# Patient Record
Sex: MALE | Employment: OTHER | URBAN - METROPOLITAN AREA
[De-identification: names, ages, dates, MRNs, and addresses within clinical notes are randomized per-mention and may not be internally consistent; named-entity substitution may affect disease eponyms.]

---

## 2018-05-30 ENCOUNTER — RECORDS - HEALTHEAST (OUTPATIENT)
Dept: LAB | Facility: CLINIC | Age: 73
End: 2018-05-30

## 2018-05-31 LAB
ANION GAP SERPL CALCULATED.3IONS-SCNC: 9 MMOL/L (ref 5–18)
BUN SERPL-MCNC: 31 MG/DL (ref 8–28)
CALCIUM SERPL-MCNC: 8.7 MG/DL (ref 8.5–10.5)
CHLORIDE BLD-SCNC: 106 MMOL/L (ref 98–107)
CO2 SERPL-SCNC: 30 MMOL/L (ref 22–31)
CREAT SERPL-MCNC: 2.15 MG/DL (ref 0.7–1.3)
GFR SERPL CREATININE-BSD FRML MDRD: 30 ML/MIN/1.73M2
GLUCOSE BLD-MCNC: 90 MG/DL (ref 70–125)
POTASSIUM BLD-SCNC: 3.8 MMOL/L (ref 3.5–5)
SODIUM SERPL-SCNC: 145 MMOL/L (ref 136–145)

## 2020-10-29 ENCOUNTER — NURSING HOME VISIT (OUTPATIENT)
Dept: GERIATRICS | Facility: CLINIC | Age: 75
End: 2020-10-29
Payer: COMMERCIAL

## 2020-10-29 VITALS
SYSTOLIC BLOOD PRESSURE: 77 MMHG | RESPIRATION RATE: 18 BRPM | OXYGEN SATURATION: 95 % | WEIGHT: 192 LBS | DIASTOLIC BLOOD PRESSURE: 51 MMHG | HEART RATE: 80 BPM | BODY MASS INDEX: 26.88 KG/M2 | TEMPERATURE: 96.9 F | HEIGHT: 71 IN

## 2020-10-29 DIAGNOSIS — D62 ANEMIA DUE TO BLOOD LOSS, ACUTE: ICD-10-CM

## 2020-10-29 DIAGNOSIS — G47.00 INSOMNIA, UNSPECIFIED TYPE: Primary | ICD-10-CM

## 2020-10-29 DIAGNOSIS — I48.0 PAF (PAROXYSMAL ATRIAL FIBRILLATION) (H): ICD-10-CM

## 2020-10-29 DIAGNOSIS — R53.81 PHYSICAL DECONDITIONING: ICD-10-CM

## 2020-10-29 DIAGNOSIS — I50.22 CHRONIC SYSTOLIC CONGESTIVE HEART FAILURE (H): ICD-10-CM

## 2020-10-29 DIAGNOSIS — F32.A DEPRESSION, UNSPECIFIED DEPRESSION TYPE: ICD-10-CM

## 2020-10-29 DIAGNOSIS — Z71.89 ADVANCED DIRECTIVES, COUNSELING/DISCUSSION: ICD-10-CM

## 2020-10-29 DIAGNOSIS — E03.9 HYPOTHYROIDISM, UNSPECIFIED TYPE: ICD-10-CM

## 2020-10-29 DIAGNOSIS — T14.8XXA HEMATOMA OF SKIN: Primary | ICD-10-CM

## 2020-10-29 DIAGNOSIS — I25.119 CORONARY ARTERY DISEASE INVOLVING NATIVE CORONARY ARTERY OF NATIVE HEART WITH ANGINA PECTORIS (H): ICD-10-CM

## 2020-10-29 DIAGNOSIS — N18.30 STAGE 3 CHRONIC KIDNEY DISEASE, UNSPECIFIED WHETHER STAGE 3A OR 3B CKD (H): ICD-10-CM

## 2020-10-29 DIAGNOSIS — M79.604 PAIN OF RIGHT LOWER EXTREMITY: ICD-10-CM

## 2020-10-29 PROCEDURE — 99310 SBSQ NF CARE HIGH MDM 45: CPT | Performed by: NURSE PRACTITIONER

## 2020-10-29 RX ORDER — ACETAMINOPHEN 500 MG
1000 TABLET ORAL 3 TIMES DAILY
COMMUNITY

## 2020-10-29 RX ORDER — CARVEDILOL 25 MG/1
25 TABLET ORAL 2 TIMES DAILY WITH MEALS
COMMUNITY
End: 2020-11-06

## 2020-10-29 RX ORDER — OXYCODONE HYDROCHLORIDE 5 MG/1
5-15 TABLET ORAL EVERY 6 HOURS PRN
COMMUNITY
End: 2020-10-29

## 2020-10-29 RX ORDER — LIDOCAINE 4 G/G
1 PATCH TOPICAL EVERY 24 HOURS
COMMUNITY
End: 2020-11-06

## 2020-10-29 RX ORDER — METOLAZONE 5 MG/1
5 TABLET ORAL DAILY
COMMUNITY
End: 2020-11-06

## 2020-10-29 RX ORDER — LEVOTHYROXINE SODIUM 112 UG/1
112 TABLET ORAL DAILY
COMMUNITY
End: 2020-11-06

## 2020-10-29 RX ORDER — OXYCODONE HYDROCHLORIDE 5 MG/1
5-15 TABLET ORAL EVERY 6 HOURS PRN
Qty: 60 TABLET | Refills: 0 | Status: SHIPPED | OUTPATIENT
Start: 2020-10-29 | End: 2020-11-02

## 2020-10-29 RX ORDER — NITROGLYCERIN 0.4 MG/1
0.4 TABLET SUBLINGUAL EVERY 5 MIN PRN
COMMUNITY

## 2020-10-29 RX ORDER — ZOLPIDEM TARTRATE 5 MG/1
5 TABLET ORAL
Qty: 15 TABLET | Refills: 0 | Status: SHIPPED | OUTPATIENT
Start: 2020-10-29 | End: 2020-11-06

## 2020-10-29 RX ORDER — ZOLPIDEM TARTRATE 5 MG/1
5 TABLET ORAL
COMMUNITY
Start: 2020-10-28 | End: 2020-10-29

## 2020-10-29 RX ORDER — ISOSORBIDE MONONITRATE 30 MG/1
30 TABLET, EXTENDED RELEASE ORAL DAILY
COMMUNITY
End: 2020-11-06

## 2020-10-29 RX ORDER — ATORVASTATIN CALCIUM 80 MG/1
80 TABLET, FILM COATED ORAL AT BEDTIME
COMMUNITY
End: 2020-11-06

## 2020-10-29 RX ORDER — BUPROPION HYDROCHLORIDE 150 MG/1
150 TABLET, FILM COATED, EXTENDED RELEASE ORAL DAILY
COMMUNITY
End: 2020-11-06

## 2020-10-29 RX ORDER — POLYETHYLENE GLYCOL 3350 17 G/17G
1 POWDER, FOR SOLUTION ORAL DAILY
COMMUNITY

## 2020-10-29 RX ORDER — BUPROPION HYDROCHLORIDE 300 MG/1
300 TABLET ORAL EVERY MORNING
COMMUNITY
End: 2020-11-06

## 2020-10-29 RX ORDER — HYDRALAZINE HYDROCHLORIDE 10 MG/1
10 TABLET, FILM COATED ORAL 3 TIMES DAILY
COMMUNITY
End: 2020-11-06

## 2020-10-29 RX ORDER — AMOXICILLIN 250 MG
2 CAPSULE ORAL 2 TIMES DAILY
COMMUNITY

## 2020-10-29 RX ORDER — MIRTAZAPINE 15 MG/1
15 TABLET, FILM COATED ORAL AT BEDTIME
COMMUNITY
End: 2020-11-06

## 2020-10-29 ASSESSMENT — MIFFLIN-ST. JEOR: SCORE: 1618.04

## 2020-10-29 NOTE — PROGRESS NOTES
[facility]  PRIMARY CARE PROVIDER AND CLINIC:  Physician No Ref-Primary, No address on file  Chief Complaint   Patient presents with     Hospital F/U     [facility] Record Number:  [id number]  Place of Service where encounter took place:  [facility] [[id number]]    Florentino Kirkland  is a 75 year old  (1945), admitted to the above facility from  [facility] . Hospital stay 10/21/2020 through 10/28/2020.  Admitted to this facility for  rehab, medical management and nursing care.    HPI:    HPI information obtained from: facility chart records, facility staff, patient report and [facility] chart review.   Brief Summary of Hospital Course:   75 y.o. male with history of CAD (s/p CABG x3), sCHF (EF 44%), AF on Eliquis, CKD, hypothyroidism, hx DVT, depression and lung cancer hospitalized 10/21/20 with progressive right leg pain and difficulty ambulation following a fall 2 days PTA. Noted a significant right leg hematoma. Hgb 12.6. Imaging without fractures but subcutaneous edema anterior to right knee. Vascular Surgery: held anticoagulation with elevation and ACE wraps of the leg. Patient developed numbness/burning of her leg overnight but Vascular Surgery felt unlikely compartment syndrome. Patient failed ambulation trial 10/22 and so decision was made to perform hematoma evacuation and VAC placement 10/23, Apixaban resumed on 10/24. Pain team consulted for better pain control and needs TCU for rehab.     Updates on Status Since Skilled nursing Admission:   Patient seen for initial TCU visit. Reports pain controlled with currently ordered PRN oxycodone. No headaches, dizziness, chest pain, dyspnea, bowel or bladder issues. On EMR review note -156/80-88 but had one reading of 77/51. Sats 96% on room air. HR 60-100s. Wound VAC in place with MWF dressing changes. PTA lives independently in his own apartment.     CODE STATUS/ADVANCE DIRECTIVES  DISCUSSION:  Full Code  Patient's living condition: lives with partner  ALLERGIES: Patient has no known allergies.  PAST MEDICAL HISTORY:  has no past medical history on file.  PAST SURGICAL HISTORY:   has no past surgical history on file.  FAMILY HISTORY: family history is not on file.  SOCIAL HISTORY:       Post Discharge Medication Reconciliation Status: discharge medications reconciled, continue medications without change  Current Outpatient Medications   Medication Sig Dispense Refill     acetaminophen (TYLENOL) 500 MG tablet Take 1,000 mg by mouth 3 times daily       apixaban ANTICOAGULANT (ELIQUIS) 2.5 MG tablet Take 2.5 mg by mouth 2 times daily       atorvastatin (LIPITOR) 80 MG tablet Take 80 mg by mouth At Bedtime       buPROPion (WELLBUTRIN XL) 300 MG 24 hr tablet Take 300 mg by mouth every morning For depression Take with 150 mg tab for 450 mg dose       buPROPion (ZYBAN) 150 MG 12 hr tablet Take 150 mg by mouth daily For Depression Take with 300 mg tab for 450 mg dose.       carvedilol (COREG) 25 MG tablet Take 25 mg by mouth 2 times daily (with meals)       hydrALAZINE (APRESOLINE) 10 MG tablet Take 10 mg by mouth 3 times daily       isosorbide mononitrate (IMDUR) 30 MG 24 hr tablet Take 30 mg by mouth daily       levothyroxine (SYNTHROID/LEVOTHROID) 112 MCG tablet Take 112 mcg by mouth daily       Lidocaine (LIDOCARE) 4 % Patch Place 1 patch onto the skin every 24 hours Apply to affected area topically one time a day for Hematoma right lower extremity and remove per schedule. To prevent lidocaine toxicity, patient should be patch free for 12 hrs daily.       metolazone (ZAROXOLYN) 5 MG tablet Take 5 mg by mouth daily       mirtazapine (REMERON) 15 MG tablet Take 15 mg by mouth At Bedtime       nitroGLYcerin (NITROSTAT) 0.4 MG sublingual tablet Place 0.4 mg under the tongue every 5 minutes as needed for chest pain For chest pain place 1 tablet under the tongue every 5 minutes for 3 doses. If symptoms  "persist 5 minutes after 1st dose call 911.       polyethylene glycol (MIRALAX) 17 g packet Take 1 packet by mouth daily       senna-docusate (SENOKOT-S/PERICOLACE) 8.6-50 MG tablet Take 2 tablets by mouth 2 times daily       oxyCODONE (ROXICODONE) 5 MG tablet Take 1-3 tablets (5-15 mg) by mouth every 6 hours as needed for severe pain Give 5 mg by mouth every 4 hours as needed for Pain 1-5/10. Give 10 mg by mouth every 4 hours as needed for Pain 6-8/10. Give 15 mg by mouth every 4 hours as needed for Pain 9-10/10 60 tablet 0     zolpidem (AMBIEN) 5 MG tablet Take 1 tablet (5 mg) by mouth nightly as needed for sleep 15 tablet 0       ROS:  4 point ROS including Respiratory, CV, GI and , other than that noted in the HPI,  is negative    Vitals:  BP (!) 77/51   Pulse 80   Temp 96.9  F (36.1  C)   Resp 18   Ht 1.803 m (5' 11\")   Wt 87.1 kg (192 lb)   SpO2 95%   BMI 26.78 kg/m    Exam:  Exam is limited due to COVID-19 precautions  GENERAL APPEARANCE:  Alert, in no distress, cooperative  ENT:  Mouth normal, moist mucous membranes, normal hearing acuity  EYES:  Conjunctiva and lids normal  RESP:  no respiratory distress, on room air, LSC  CV: HRR, no edema  MS/SKIN: right leg wound covered with wound VAC, patent.   NEURO:   No facial asymmetry, speech clear  PSYCH:  oriented X 3, affect and mood normal     Lab/Diagnostic data:          ASSESSMENT/PLAN:  Hematoma of skin  Acute onset, now has wound vac. Order on discharge to keep right leg in knee immobilizer but not sent with patient: staff to clarify with surgeon if needs to be used or could be stopped. Pain meds as above. Staff to update provider if not effective.     Anemia due to blood loss, acute  Acute, repeat CBC this week and f/u with result.     PAF (paroxysmal atrial fibrillation) (H)  Chronic. Continue Eliquis f/u PRN.     Chronic systolic congestive heart failure (H)  Stage 3 chronic kidney disease, unspecified whether stage 3a or 3b CKD  Coronary " artery disease involving native coronary artery of native heart with angina pectoris (H)  Chronic, stable and no symptoms. Meds, vs, wt as ordered. F/U with BMP this week. Avoid nephrotoxic meds.     Hypothyroidism, unspecified type  Chronic, meds as PTA. Monitor.     Depression, unspecified depression type  No symptoms at this time. Meds as above and ACP consult if needed.     Physical deconditioning  Acute, therapies - f/u with progress next visit.     Advanced directives, counseling/discussion  Reviewed POLST: full code desired at TCU.      Orders written by provider at facility  1. Full Code  2. CBC, BMP on 10/30 diagnosis anemia, CKD  3. F/U with surgeon re: does patient need knee immobilizer in place at all times or can order be discontinued?    Total unit/floor time of 38 minutes consisted of the following: Examination of patient, reviewing the record including pertinent labs and imaging, placing orders, and completing documentation.  More than 50% of this time (20 minutes) (>50%) was spent in coordination of care with the patient, nursing staff and other healthcare providers.   This time was spent on discussing the care plan including hospital course, hospital discharge recommendations, recent TCU course and concerns, medications, wound cares, discharge/safe placement, specialty follow up need.  Time was also spent on discussing POLST and code status.       Time with patient included: Discussion of diagnostic and imaging test results, diagnosis and prognosis, overall goal and disposition plan.      -Medical decision making and discussions included:  Risks/benefits of current pain meds ordered  Wound care details reviewed  PT/OT restrictions to include up with assist, clarifying knee immobilizer orders  HTN, CHF management plan to include meds, monitoring and f/u next visit.   CKD management plan to include monitor BMP this week, avoid nephrotoxic meds.     -Rx management plan discussion included:  Follow up  needed with wound team as ordered.   Disposition and discharge plan to include likely need for home care after discharge  Patient education concerning POLST    -Time on communication of care included:  Updated RN, RN manager, [facility] on above orders and POC     Electronically signed by:  JIMMIE Johnson CNP

## 2020-10-29 NOTE — PROGRESS NOTES
[facility]  INITIAL VISIT NOTE  October 30, 2020    PRIMARY CARE PROVIDER AND CLINIC:  No Ref-Primary, Physician No address on file    CHIEF COMPLAINT:  Hospital follow-up/Initial visit    HPI:    Florentino Kirkland is a 75 year old  (1945) male who was seen at [facility] on October 30, 2020 for an initial visit.     Medical history is notable for coronary artery disease, status post CABG x3, chronic heart failure with reduced ejection fraction, paroxysmal atrial fibrillation on Eliquis, non-small cell lung cancer, hypertension, dyslipidemia, CKD stage III, left axillary vein DVT, hypothyroidism, prediabetes, BPH, depression, and BHARGAVI, intolerant of CPAP.    Summary of hospital course:  Patient was hospitalized at [facility] from October 21 through October 28, 2020 after he presented with progressive right leg pain and difficulty ambulation following a fall 2 days prior.  He was found to have hematoma of right lower extremity in the setting of chronic anticoagulation therapy with Eliquis.  Imaging was negative for fracture.  Right lower extremity CT scan demonstrated intramuscular hematoma in the vastus lateralis which was erythematous and mildly expanded.  Total CK was 204. Initial conservative management failed and therefore vascular surgery decided to proceed with hematoma evacuation and wound VAC placement on October 23.  Eliquis was resumed on October 24.  Pain team was consulted for optimal pain management.  TCU was recommended by therapies.  PTA bumetanide was discontinued.  Upon discharge from hospital, hemoglobin was down to 11.4 from initial 12.6.    Patient is admitted to this facility for medical management, nursing care, and rehab.     Today's visit:  Patient was seen in his room while lying in bed comfortably.  The wound VAC was exchanged earlier today.  He denies any pain.  He reports no fever, chills, chest pain, palpitation, dyspnea, nausea, vomiting,  abdominal pain, diarrhea, or urinary symptoms.  He had a normal bowel movement earlier today with no melena or hematochezia.    CODE STATUS:   CPR/Full code     PAST MEDICAL HISTORY:   Coronary artery disease, status post CABG x3 in November 2017  Chronic heart failure with reduced ejection fraction, LVEF 44% on August 26, 2020  Ascending aorta aneurysm, measuring 5 cm by echo in August 2020  Paroxysmal atrial fibrillation, on Eliquis  Hypertension  Dyslipidemia  Left upper lobe non-small cell lung cancer, status post thoracotomy and left upper lobe wedge resection in January 2018  Left axillary vein DVT  CKD stage III, baseline creatinine 1.3-1.6  Membranous nephropathy  Essential tremor  Hypothyroidism  Prediabetes  BPH  Pneumonia  Depression with anxiety  Colon polyps  BHARGAVI, intolerant of CPAP  Histoplasmosis in April 2017  Acetabular fracture  Fall resulting in right lower extremity hematoma in October 2020    PAST SURGICAL HISTORY:   Bilateral hernia repair  CABG x3 in November 2017  Thoracotomy and left upper lobe wedge resection in January 2018 for lung cancer  Right lower extremity hematoma evacuation and wound VAC placement on October 23, 2020    FAMILY HISTORY:   History of coronary disease/CABG and diabetes in his father, heart disease in his mother, and depression in his siblings.    SOCIAL HISTORY:  Patient is single and lives in an apartment independently.  He normally does not use any assistive device for ambulation.  He is a former smoker and quit in January 1996 after 35-pack-year history of smoking. He drinks 3-5 acholic drinks a week.    Social History     Tobacco Use     Smoking status: Not on file   Substance Use Topics     Alcohol use: Not on file       MEDICATIONS:  Current Outpatient Medications   Medication Sig Dispense Refill     acetaminophen (TYLENOL) 500 MG tablet Take 1,000 mg by mouth 3 times daily       apixaban ANTICOAGULANT (ELIQUIS) 2.5 MG tablet Take 2.5 mg by mouth 2 times daily        atorvastatin (LIPITOR) 80 MG tablet Take 80 mg by mouth At Bedtime       buPROPion (WELLBUTRIN XL) 300 MG 24 hr tablet Take 300 mg by mouth every morning For depression Take with 150 mg tab for 450 mg dose       buPROPion (ZYBAN) 150 MG 12 hr tablet Take 150 mg by mouth daily For Depression Take with 300 mg tab for 450 mg dose.       carvedilol (COREG) 25 MG tablet Take 25 mg by mouth 2 times daily (with meals)       hydrALAZINE (APRESOLINE) 10 MG tablet Take 10 mg by mouth 3 times daily       isosorbide mononitrate (IMDUR) 30 MG 24 hr tablet Take 30 mg by mouth daily       levothyroxine (SYNTHROID/LEVOTHROID) 112 MCG tablet Take 112 mcg by mouth daily       Lidocaine (LIDOCARE) 4 % Patch Place 1 patch onto the skin every 24 hours Apply to affected area topically one time a day for Hematoma right lower extremity and remove per schedule. To prevent lidocaine toxicity, patient should be patch free for 12 hrs daily.       metolazone (ZAROXOLYN) 5 MG tablet Take 5 mg by mouth daily       mirtazapine (REMERON) 15 MG tablet Take 15 mg by mouth At Bedtime       nitroGLYcerin (NITROSTAT) 0.4 MG sublingual tablet Place 0.4 mg under the tongue every 5 minutes as needed for chest pain For chest pain place 1 tablet under the tongue every 5 minutes for 3 doses. If symptoms persist 5 minutes after 1st dose call 911.       oxyCODONE (ROXICODONE) 5 MG tablet Take 1-3 tablets (5-15 mg) by mouth every 6 hours as needed for severe pain Give 5 mg by mouth every 4 hours as needed for Pain 1-5/10. Give 10 mg by mouth every 4 hours as needed for Pain 6-8/10. Give 15 mg by mouth every 4 hours as needed for Pain 9-10/10 60 tablet 0     polyethylene glycol (MIRALAX) 17 g packet Take 1 packet by mouth daily       senna-docusate (SENOKOT-S/PERICOLACE) 8.6-50 MG tablet Take 2 tablets by mouth 2 times daily       zolpidem (AMBIEN) 5 MG tablet Take 1 tablet (5 mg) by mouth nightly as needed for sleep 15 tablet 0       ALLERGIES:  No Known  Allergies    ROS:  10 point ROS neg other than the symptoms noted above in the HPI.    PHYSICAL EXAM:  Vital signs were reviewed in the chart.  Blood pressure 100/66, heart rate 89, respiratory rate 16, temperature 97.5  F, oxygen saturation 96%, weight 178 LBS  Gen: Cooperative, comfortable, and in no acute distress  HEENT: Normocephalic; oropharynx clear  Card: Normal S1, S2, RRR  Resp: Lungs clear to auscultation bilaterally  GI: Abdomen soft, non-tender, non-distended, +BS  Ext: Trace to 1+ RLE edema  Neuro: CX II-XII grossly intact; ROM in all four extremities grossly intact  Psych: Alert and oriented x3; normal affect  Skin: Right lower extremity wound VAC is in place    LABORATORY/IMAGING DATA:  Creatinine 1.61 on October 28, 2020  Chemistry profile (October 24, 2020): Sodium 141, potassium 3.8, creatinine 1.48, glucose 135, calcium 8.5  Hematology profile (October 25, 2020): White count 10.5, hemoglobin 11.4, platelet count 208,000, MCV 97    ASSESSMENT/PLAN:  Fall, subsequent encounter,  Right lower extremity hematoma, status post evacuation and wound VAC placement on October 23, 2020,  Acute blood loss anemia,  Physical deconditioning.  Patient is hemodynamically stable.  Last hemoglobin was 11.4 on October 25, 2020.   Wound VAC was replaced earlier today.  Plan:  Pain management with scheduled acetaminophen, lidocaine patch, and as needed oxycodone  Exchange wound VAC every other day  Continue wound care per vascular surgery instructions  Fall precautions  PT/OT evaluation and therapy  Monitor hemoglobin periodically    Coronary artery disease, status post CABG x3 in November 2017,  Chronic heart failure with reduced ejection fraction, LVEF 44% on August 26, 2020,  Ascending aorta aneurysm, measuring 5 cm by echo in August 2020,  Paroxysmal atrial fibrillation, on Eliquis,  Hypertension,  Dyslipidemia.  Stable.  Bumetanide was discontinued in the hospital.  Plan:  Continue PTA carvedilol 25 mg p.o. twice  daily, Imdur 30 mg p.o. daily, atorvastatin 80 mg p.o. at bedtime, metolazone 5 mg p.o. in the morning, and hydralazine 10 mg p.o. 3 times daily  Continue chronic anticoagulation therapy with Eliquis 2.5 mg p.o. twice daily  Monitor blood pressure, heart rate, volume status, and weights  Follow-up with cardiology as instructed    CKD stage III.  Baseline creatinine 1.3-1.6.  Last creatinine was 1.61 on October 28, 2020.  Plan:  Avoid NSAIDs and nephrotoxins  Monitor renal function periodically     Hypothyroidism.  Chronic.  Last TSH was 1.04 on October 16, 2020.  Plan:  Continue PTA levothyroxine 112 mcg p.o. daily    Depression,  Anxiety.  Chronic and stable.  Plan:  Continue PTA mirtazapine 15 mg p.o. at bedtime and bupropion  mg p.o. daily    BHARGAVI.  Intolerant of CPAP.    History of left upper lobe non-small cell lung cancer.  Patient is status post thoracotomy and left upper lobe wedge resection in January 2018.  Plan:  Follow-up as instructed    Orders written by provider at facility:  None.    Addendum:  Labs from today were reviewed.  BMP shows sodium of 138, potassium of 3, BUN of 42, creatinine 1.52, calcium of 8.9, and glucose of 133.  CBC reveals white count of 12.2, hemoglobin of 11.8, and platelet count of 293,000.  I placed an order to give potassium chloride 40 mEq p.o. x1    Electronically signed by:  Nicholas Tom MD

## 2020-10-30 ENCOUNTER — HOSPITAL LABORATORY (OUTPATIENT)
Dept: OTHER | Facility: CLINIC | Age: 75
End: 2020-10-30

## 2020-10-30 ENCOUNTER — NURSING HOME VISIT (OUTPATIENT)
Dept: GERIATRICS | Facility: CLINIC | Age: 75
End: 2020-10-30
Payer: COMMERCIAL

## 2020-10-30 VITALS
WEIGHT: 178 LBS | HEIGHT: 71 IN | RESPIRATION RATE: 16 BRPM | DIASTOLIC BLOOD PRESSURE: 75 MMHG | SYSTOLIC BLOOD PRESSURE: 132 MMHG | TEMPERATURE: 97.3 F | OXYGEN SATURATION: 95 % | BODY MASS INDEX: 24.92 KG/M2 | HEART RATE: 87 BPM

## 2020-10-30 DIAGNOSIS — F32.A DEPRESSION, UNSPECIFIED DEPRESSION TYPE: ICD-10-CM

## 2020-10-30 DIAGNOSIS — G47.33 OSA (OBSTRUCTIVE SLEEP APNEA): ICD-10-CM

## 2020-10-30 DIAGNOSIS — Z85.118 HISTORY OF LUNG CANCER: ICD-10-CM

## 2020-10-30 DIAGNOSIS — N18.30 STAGE 3 CHRONIC KIDNEY DISEASE, UNSPECIFIED WHETHER STAGE 3A OR 3B CKD (H): ICD-10-CM

## 2020-10-30 DIAGNOSIS — I25.10 CORONARY ARTERY DISEASE INVOLVING NATIVE CORONARY ARTERY OF NATIVE HEART WITHOUT ANGINA PECTORIS: ICD-10-CM

## 2020-10-30 DIAGNOSIS — S80.11XD HEMATOMA OF RIGHT LOWER EXTREMITY, SUBSEQUENT ENCOUNTER: ICD-10-CM

## 2020-10-30 DIAGNOSIS — I48.0 PAF (PAROXYSMAL ATRIAL FIBRILLATION) (H): ICD-10-CM
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
## 2020-10-30 DIAGNOSIS — F41.9 ANXIETY: ICD-10-CM

## 2020-10-30 DIAGNOSIS — E03.9 HYPOTHYROIDISM, UNSPECIFIED TYPE: ICD-10-CM

## 2020-10-30 DIAGNOSIS — R53.81 PHYSICAL DECONDITIONING: ICD-10-CM

## 2020-10-30 DIAGNOSIS — E78.5 DYSLIPIDEMIA: ICD-10-CM

## 2020-10-30 DIAGNOSIS — I77.810 ASCENDING AORTA DILATATION (H): ICD-10-CM

## 2020-10-30 DIAGNOSIS — I10 ESSENTIAL HYPERTENSION: ICD-10-CM

## 2020-10-30 DIAGNOSIS — D62 ANEMIA DUE TO BLOOD LOSS, ACUTE: ICD-10-CM

## 2020-10-30 LAB
ANION GAP SERPL CALCULATED.3IONS-SCNC: 4 MMOL/L (ref 3–14)
BUN SERPL-MCNC: 42 MG/DL (ref 7–30)
CALCIUM SERPL-MCNC: 8.9 MG/DL (ref 8.5–10.1)
CHLORIDE SERPL-SCNC: 101 MMOL/L (ref 94–109)
CO2 SERPL-SCNC: 33 MMOL/L (ref 20–32)
CREAT SERPL-MCNC: 1.52 MG/DL (ref 0.66–1.25)
ERYTHROCYTE [DISTWIDTH] IN BLOOD BY AUTOMATED COUNT: 14.1 % (ref 10–15)
GFR SERPL CREATININE-BSD FRML MDRD: 44 ML/MIN/{1.73_M2}
GLUCOSE SERPL-MCNC: 133 MG/DL (ref 70–99)
HCT VFR BLD AUTO: 37.2 % (ref 40–53)
HGB BLD-MCNC: 11.8 G/DL (ref 13.3–17.7)
MCH RBC QN AUTO: 31.4 PG (ref 26.5–33)
MCHC RBC AUTO-ENTMCNC: 31.7 G/DL (ref 31.5–36.5)
MCV RBC AUTO: 99 FL (ref 78–100)
PLATELET # BLD AUTO: 293 10E9/L (ref 150–450)
POTASSIUM SERPL-SCNC: 3 MMOL/L (ref 3.4–5.3)
RBC # BLD AUTO: 3.76 10E12/L (ref 4.4–5.9)
SODIUM SERPL-SCNC: 138 MMOL/L (ref 133–144)
WBC # BLD AUTO: 12.2 10E9/L (ref 4–11)

## 2020-10-30 PROCEDURE — 99305 1ST NF CARE MODERATE MDM 35: CPT | Mod: AI | Performed by: INTERNAL MEDICINE

## 2020-10-30 ASSESSMENT — MIFFLIN-ST. JEOR: SCORE: 1554.53

## 2020-11-01 ENCOUNTER — TELEPHONE (OUTPATIENT)
Dept: GERIATRICS | Facility: CLINIC | Age: 75
End: 2020-11-01

## 2020-11-02 DIAGNOSIS — M79.604 PAIN OF RIGHT LOWER EXTREMITY: ICD-10-CM

## 2020-11-02 RX ORDER — OXYCODONE HYDROCHLORIDE 5 MG/1
5-15 TABLET ORAL EVERY 6 HOURS PRN
Qty: 60 TABLET | Refills: 0 | Status: SHIPPED | OUTPATIENT
Start: 2020-11-02 | End: 2020-11-03

## 2020-11-02 NOTE — TELEPHONE ENCOUNTER
Possible weight gain of 7 lbs in one day.  Staff questioning scale being used because he was consistent in weight and now 183 lbs.      No new orders.  See what weight is tomorrow.    Electronically signed by Amina Boone RN, CNP

## 2020-11-03 ENCOUNTER — NURSING HOME VISIT (OUTPATIENT)
Dept: GERIATRICS | Facility: CLINIC | Age: 75
End: 2020-11-03
Payer: COMMERCIAL

## 2020-11-03 VITALS
HEIGHT: 71 IN | RESPIRATION RATE: 18 BRPM | DIASTOLIC BLOOD PRESSURE: 80 MMHG | SYSTOLIC BLOOD PRESSURE: 148 MMHG | HEART RATE: 81 BPM | WEIGHT: 182 LBS | OXYGEN SATURATION: 98 % | BODY MASS INDEX: 25.48 KG/M2 | TEMPERATURE: 97.3 F

## 2020-11-03 DIAGNOSIS — I50.22 CHRONIC SYSTOLIC CONGESTIVE HEART FAILURE (H): ICD-10-CM

## 2020-11-03 DIAGNOSIS — R53.81 PHYSICAL DECONDITIONING: ICD-10-CM

## 2020-11-03 DIAGNOSIS — D62 ANEMIA DUE TO BLOOD LOSS, ACUTE: ICD-10-CM

## 2020-11-03 DIAGNOSIS — M79.604 PAIN OF RIGHT LOWER EXTREMITY: ICD-10-CM

## 2020-11-03 DIAGNOSIS — E78.5 DYSLIPIDEMIA: ICD-10-CM

## 2020-11-03 DIAGNOSIS — L89.151 PRESSURE INJURY OF SACRAL REGION, STAGE 1: ICD-10-CM

## 2020-11-03 DIAGNOSIS — Z85.118 HISTORY OF LUNG CANCER: ICD-10-CM

## 2020-11-03 DIAGNOSIS — N18.30 STAGE 3 CHRONIC KIDNEY DISEASE, UNSPECIFIED WHETHER STAGE 3A OR 3B CKD (H): ICD-10-CM
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
## 2020-11-03 DIAGNOSIS — S80.11XD HEMATOMA OF RIGHT LOWER EXTREMITY, SUBSEQUENT ENCOUNTER: Primary | ICD-10-CM

## 2020-11-03 DIAGNOSIS — I25.119 CORONARY ARTERY DISEASE INVOLVING NATIVE CORONARY ARTERY OF NATIVE HEART WITH ANGINA PECTORIS (H): ICD-10-CM

## 2020-11-03 DIAGNOSIS — F41.9 ANXIETY: ICD-10-CM

## 2020-11-03 DIAGNOSIS — I10 ESSENTIAL HYPERTENSION: ICD-10-CM

## 2020-11-03 DIAGNOSIS — I48.0 PAF (PAROXYSMAL ATRIAL FIBRILLATION) (H): ICD-10-CM

## 2020-11-03 DIAGNOSIS — E03.9 HYPOTHYROIDISM, UNSPECIFIED TYPE: ICD-10-CM

## 2020-11-03 DIAGNOSIS — F32.A DEPRESSION, UNSPECIFIED DEPRESSION TYPE: ICD-10-CM

## 2020-11-03 PROCEDURE — 99207 PR CDG-MDM COMPONENT: MEETS HIGH - UP CODED: CPT | Performed by: NURSE PRACTITIONER

## 2020-11-03 PROCEDURE — 99310 SBSQ NF CARE HIGH MDM 45: CPT | Performed by: NURSE PRACTITIONER

## 2020-11-03 RX ORDER — OXYCODONE HYDROCHLORIDE 5 MG/1
5-15 TABLET ORAL EVERY 6 HOURS PRN
Qty: 60 TABLET | Refills: 0 | Status: SHIPPED | OUTPATIENT
Start: 2020-11-03 | End: 2020-11-06

## 2020-11-03 ASSESSMENT — MIFFLIN-ST. JEOR: SCORE: 1572.68

## 2020-11-03 NOTE — PROGRESS NOTES
[facility]  [place] Medical Record Number:  [id number]  Place of Service where encounter took place:  [facility] [[id number]]  Chief Complaint   Patient presents with     RECHECK       HPI:    Florentino Kirkland  is a 75 year old (1945), who is being seen today for an episodic care visit.  HPI information obtained from: facility chart records, facility staff, patient report and [facility] chart review.     Per recent TCU provider progress notes:   75 y.o. male with history of CAD (s/p CABG x3), sCHF (EF 44%), AF on Eliquis, CKD, hypothyroidism, hx DVT, depression and lung cancer hospitalized 10/21/20 with progressive right leg pain and difficulty ambulation following a fall 2 days PTA. Noted a significant right leg hematoma. Hgb 12.6. Imaging without fractures but subcutaneous edema anterior to right knee. Vascular Surgery: held anticoagulation with elevation and ACE wraps of the leg. Patient developed numbness/burning of her leg overnight but Vascular Surgery felt unlikely compartment syndrome. Patient failed ambulation trial 10/22 and so decision was made to perform hematoma evacuation and VAC placement 10/23, Apixaban resumed on 10/24. Pain team consulted for better pain control and needs TCU for rehab. Due to K low at 3.0 lat check received KCL 40 meq PO x 1.     Today's concern is:  Patient seen for episodic TCU visit today. Reports doing well overall. No headaches, dizziness, chest pain, dyspnea, bowel or bladder issues. Does note increased edema of arms - per home routine would take extra diuretic (Bumex 1 mg) daily for three days to manage. We discussed doing same here with additional KCL and renal function recheck this week. Per EMR review note weight up 2 lbs since admission. BP range 120-148/76-91 and sats are 94% on room air. He also reports coccyx/sacral pain and has non-blanchable area but no open areas. We talked about adding APM.     Past Medical and  Surgical History reviewed in Epic today.    MEDICATIONS:  Current Outpatient Medications   Medication Sig Dispense Refill     acetaminophen (TYLENOL) 500 MG tablet Take 1,000 mg by mouth 3 times daily       apixaban ANTICOAGULANT (ELIQUIS) 2.5 MG tablet Take 2.5 mg by mouth 2 times daily       atorvastatin (LIPITOR) 80 MG tablet Take 80 mg by mouth At Bedtime       buPROPion (WELLBUTRIN XL) 300 MG 24 hr tablet Take 300 mg by mouth every morning For depression Take with 150 mg tab for 450 mg dose       buPROPion (ZYBAN) 150 MG 12 hr tablet Take 150 mg by mouth daily For Depression Take with 300 mg tab for 450 mg dose.       carvedilol (COREG) 25 MG tablet Take 25 mg by mouth 2 times daily (with meals)       hydrALAZINE (APRESOLINE) 10 MG tablet Take 10 mg by mouth 3 times daily       isosorbide mononitrate (IMDUR) 30 MG 24 hr tablet Take 30 mg by mouth daily       levothyroxine (SYNTHROID/LEVOTHROID) 112 MCG tablet Take 112 mcg by mouth daily       Lidocaine (LIDOCARE) 4 % Patch Place 1 patch onto the skin every 24 hours Apply to affected area topically one time a day for Hematoma right lower extremity and remove per schedule. To prevent lidocaine toxicity, patient should be patch free for 12 hrs daily.       metolazone (ZAROXOLYN) 5 MG tablet Take 5 mg by mouth daily       mirtazapine (REMERON) 15 MG tablet Take 15 mg by mouth At Bedtime       nitroGLYcerin (NITROSTAT) 0.4 MG sublingual tablet Place 0.4 mg under the tongue every 5 minutes as needed for chest pain For chest pain place 1 tablet under the tongue every 5 minutes for 3 doses. If symptoms persist 5 minutes after 1st dose call 911.       oxyCODONE (ROXICODONE) 5 MG tablet Take 1-3 tablets (5-15 mg) by mouth every 6 hours as needed for severe pain Give 5 mg by mouth every 4 hours as needed for Pain 1-5/10. Give 10 mg by mouth every 4 hours as needed for Pain 6-8/10. Give 15 mg by mouth every 4 hours as needed for Pain 9-10/10 60 tablet 0     polyethylene  "glycol (MIRALAX) 17 g packet Take 1 packet by mouth daily       senna-docusate (SENOKOT-S/PERICOLACE) 8.6-50 MG tablet Take 2 tablets by mouth 2 times daily       zolpidem (AMBIEN) 5 MG tablet Take 1 tablet (5 mg) by mouth nightly as needed for sleep 15 tablet 0       REVIEW OF SYSTEMS:  4 point ROS including Respiratory, CV, GI and , other than that noted in the HPI,  is negative    Objective:  BP (!) 148/80   Pulse 81   Temp 97.3  F (36.3  C)   Resp 18   Ht 1.803 m (5' 11\")   Wt 82.6 kg (182 lb)   SpO2 98%   BMI 25.38 kg/m    Exam:  Exam is limited due to COVID-19 precautions  GENERAL APPEARANCE:  Alert, in no distress, cooperative  ENT:  Mouth normal, moist mucous membranes, normal hearing acuity  EYES:  Conjunctiva and lids normal  RESP:  no respiratory distress, LSC  CV: increased arm edema, no LE edema  SKIN: redness over sacrum, no open areas.   NEURO:   No facial asymmetry, speech clear  PSYCH:  oriented X 3, affect and mood normal     Labs:   Most Recent 3 CBC's:  Recent Labs   Lab Test 10/30/20  0905   WBC 12.2*   HGB 11.8*   MCV 99        Most Recent 3 BMP's:  Recent Labs   Lab Test 10/30/20  0905      POTASSIUM 3.0*   CHLORIDE 101   CO2 33*   BUN 42*   CR 1.52*   ANIONGAP 4   CR 8.9   *       ASSESSMENT/PLAN:  Fall, subsequent encounter  Right lower extremity hematoma, status post evacuation and wound VAC placement on October 23, 2020  Acute blood loss anemia  Physical deconditioning  Acute onset with injury, surgery. Continue wound VAC and f/u surgeon as recommended. Continue current pain med orders. Therapies - f/u with progress next visit. CBC recheck this week to f/u with Hgb.     Coronary artery disease, status post CABG x3 in November 2017  Chronic heart failure with reduced ejection fraction, LVEF 44% on August 26, 2020  Paroxysmal atrial fibrillation, on Eliquis  Hypertension  Dyslipidemia  Acute on chronic, weight and arm edema increased. Continue PTA carvedilol 25 " mg p.o. twice daily, Imdur 30 mg p.o. daily, atorvastatin 80 mg p.o. at bedtime, metolazone 5 mg p.o. in the morning, and hydralazine 10 mg p.o. 3 times daily as well as chronic anticoagulation therapy with Eliquis 2.5 mg p.o. twice daily. Will add short term course of Bumex 1 mg daily along with KCL 20 meq daily x 3 days and f/u with VS, wt, renal function end of the week.     CKD stage III  Baseline creatinine 1.3-1.6. Last creatinine was 1.52 stable. Avoid NSAIDs and nephrotoxins and f/u with BMP end of this week.      Hypothyroidism  Chronic. Continue PTA levothyroxine 112 mcg p.o. daily. Monitor per PCP routine.     Depression  Anxiety  Chronic and stable.  Continue PTA mirtazapine and bupropion    History of left upper lobe non-small cell lung cancer  Patient is status post thoracotomy and left upper lobe wedge resection in January 2018. F/U per oncology routine.     Pressure injury of sacral region stage 1  Newly noted, no open areas. Obtain APM and monitor.     Orders written by provider at facility  1. Bumex 1 mg PO daily along with KCL 20 meq PO daily x 3 days diagnosis edema  2. BMP, CBC on 11/6 diagnosis leukocytosis, anemia, CKD  3. APM due to sacral pressure injury    Electronically signed by:  JIMMIE Johnson CNP

## 2020-11-04 ENCOUNTER — DOCUMENTATION ONLY (OUTPATIENT)
Dept: OTHER | Facility: CLINIC | Age: 75
End: 2020-11-04

## 2020-11-04 ENCOUNTER — AMBULATORY - HEALTHEAST (OUTPATIENT)
Dept: OTHER | Facility: CLINIC | Age: 75
End: 2020-11-04

## 2020-11-04 ENCOUNTER — HOSPITAL LABORATORY (OUTPATIENT)
Dept: OTHER | Facility: CLINIC | Age: 75
End: 2020-11-04

## 2020-11-05 LAB
SARS-COV-2 RNA SPEC QL NAA+PROBE: NOT DETECTED
SPECIMEN SOURCE: NORMAL

## 2020-11-05 NOTE — PROGRESS NOTES
[facility] DISCHARGE SUMMARY  PATIENT'S NAME: Florentino Kirkland  YOB: 1945  MEDICAL RECORD NUMBER:  [id number]  Place of Service where encounter took place:  [facility] [[id number]]    PRIMARY CARE PROVIDER AND CLINIC RESPONSIBLE AFTER TRANSFER:   Physician No Ref-Primary, No address on file    Non-[facility] Provider     Transferring providers: JIMMIE Johnson CNP, Nicholas Tom MD  Recent Hospitalization/ED:  [facility]  stay 10/21/2020 to 10/28/2020.  Date of SNF Admission: October / 28 / 2020  Date of SNF (anticipated) Discharge: November / 09 / 2020  Discharged to: previous independent home  Cognitive Scores: SLUMS: 26/30  Physical Function: Ambulating 30 ft with 2ww  DME: [facility]    CODE STATUS/ADVANCE DIRECTIVES DISCUSSION:  Full Code   ALLERGIES: Patient has no known allergies.    Per recent TCU provider progress notes:  75 y.o. male with history of CAD (s/p CABG x3), sCHF (EF 44%), AF on Eliquis, CKD, hypothyroidism, hx DVT, depression and lung cancer hospitalized 10/21/20 with progressive right leg pain and difficulty ambulation following a fall 2 days PTA. Noted a significant right leg hematoma. Hgb 12.6. Imaging without fractures but subcutaneous edema anterior to right knee. Vascular Surgery: held anticoagulation with elevation and ACE wraps of the leg. Patient developed numbness/burning of her leg overnight but Vascular Surgery felt unlikely compartment syndrome. Patient failed ambulation trial 10/22 and so decision was made to perform hematoma evacuation and VAC placement 10/23, Apixaban resumed on 10/24. Pain team consulted for better pain control and needs TCU for rehab. Due to K low at 3.0 lat check received KCL 40 meq PO x 1. Due to arm edema added short term Bumex and KCL x 3 days. BMP recheck due 11/6.     Today reports doing well, no new complaints. Per EMR note weight up 5 lbs since admission and does have more  edema left arm. BP range 119-150/77-90 and sats 95% on room air. Afebrile. Ready to discharge home on 11/9 with [facility] for PT, OT, RN and HHA services and wound VAC wound care MWF.     DISCHARGE DIAGNOSIS/NURSING FACILITY COURSE:   Fall, subsequent encounter  Right lower extremity hematoma, status post evacuation and wound VAC placement on October 23, 2020  Acute blood loss anemia  Physical deconditioning  Acute onset with injury, surgery. Continue wound VAC and f/u surgeon as recommended. Continue current pain med orders. CBC checked today and results pending. Previously:   Lab Results   Component Value Date    HGB 11.8 10/30/2020   Ready to discharge home next week with home care and wound care as above.     Coronary artery disease, status post CABG x3 in November 2017  Chronic heart failure with reduced ejection fraction, LVEF 44% on August 26, 2020  Paroxysmal atrial fibrillation, on Eliquis  Hypertension  Dyslipidemia  Acute on chronic, weight and arm edema increased. Continue PTA carvedilol 25 mg p.o. twice daily, Imdur 30 mg p.o. daily, atorvastatin 80 mg p.o. at bedtime, metolazone 5 mg p.o. in the morning, and hydralazine 10 mg p.o. 3 times daily as well as chronic anticoagulation therapy with Eliquis 2.5 mg p.o. twice daily. Added short term course of Bumex 1 mg daily along with KCL 20 meq daily x 3 days BMP checked and pending results today. Monitor, continue wraps to left arm PRN per home routine.    CKD stage III  Baseline creatinine 1.3-1.6. Last creatinine was 1.52 stable. Avoid NSAIDs and nephrotoxins and f/u with BMP results done today.   Lab Results   Component Value Date    CR 1.52 10/30/2020      Hypothyroidism  Chronic. Continue PTA levothyroxine 112 mcg p.o. daily. Monitor per PCP routine.     Depression  Anxiety  Chronic and stable.  Continue PTA mirtazapine and bupropion    History of left upper lobe non-small cell lung cancer  Patient is status post thoracotomy and left upper lobe  wedge resection in January 2018. F/U per oncology routine.     Pressure injury of sacral region stage 1  Newly noted, no open areas. Monitor.     Past Medical History:  has no past medical history on file.    Discharge Medications:  Current Outpatient Medications   Medication Sig Dispense Refill     acetaminophen (TYLENOL) 500 MG tablet Take 1,000 mg by mouth 3 times daily       apixaban ANTICOAGULANT (ELIQUIS) 2.5 MG tablet Take 2.5 mg by mouth 2 times daily       atorvastatin (LIPITOR) 80 MG tablet Take 80 mg by mouth At Bedtime       buPROPion (WELLBUTRIN XL) 300 MG 24 hr tablet Take 300 mg by mouth every morning For depression Take with 150 mg tab for 450 mg dose       buPROPion (ZYBAN) 150 MG 12 hr tablet Take 150 mg by mouth daily For Depression Take with 300 mg tab for 450 mg dose.       carvedilol (COREG) 25 MG tablet Take 25 mg by mouth 2 times daily (with meals)       hydrALAZINE (APRESOLINE) 10 MG tablet Take 10 mg by mouth 3 times daily       isosorbide mononitrate (IMDUR) 30 MG 24 hr tablet Take 30 mg by mouth daily       levothyroxine (SYNTHROID/LEVOTHROID) 112 MCG tablet Take 112 mcg by mouth daily       Lidocaine (LIDOCARE) 4 % Patch Place 1 patch onto the skin every 24 hours Apply to affected area topically one time a day for Hematoma right lower extremity and remove per schedule. To prevent lidocaine toxicity, patient should be patch free for 12 hrs daily.       metolazone (ZAROXOLYN) 5 MG tablet Take 5 mg by mouth daily       mirtazapine (REMERON) 15 MG tablet Take 15 mg by mouth At Bedtime       nitroGLYcerin (NITROSTAT) 0.4 MG sublingual tablet Place 0.4 mg under the tongue every 5 minutes as needed for chest pain For chest pain place 1 tablet under the tongue every 5 minutes for 3 doses. If symptoms persist 5 minutes after 1st dose call 911.       Nutritional Supplement LIQD Take 1 packet by mouth 2 times daily Gutierrez       oxyCODONE (ROXICODONE) 5 MG tablet Take 1-3 tablets (5-15 mg) by mouth  "every 6 hours as needed for severe pain Give 5 mg by mouth every 4 hours as needed for Pain 1-5/10. Give 10 mg by mouth every 4 hours as needed for Pain 6-8/10. Give 15 mg by mouth every 4 hours as needed for Pain 9-10/10 60 tablet 0     polyethylene glycol (MIRALAX) 17 g packet Take 1 packet by mouth daily       senna-docusate (SENOKOT-S/PERICOLACE) 8.6-50 MG tablet Take 2 tablets by mouth 2 times daily         Medication Changes/Rationale:     Stopped PRN Ambien     Controlled medications sent with patient:   Medication Oxycodone 5 mg tabs #20 tabs no refills electronically prescribed to [facility] pharmacy     ROS:   4 point ROS including Respiratory, CV, GI and , other than that noted in the HPI,  is negative    Physical Exam:   Vitals: /77   Pulse 88   Temp 97.5  F (36.4  C)   Resp 16   Ht 1.803 m (5' 11\")   Wt 83 kg (183 lb)   SpO2 96%   BMI 25.52 kg/m    BMI= Body mass index is 25.52 kg/m .  Exam is limited due to COVID-19 precautions  GENERAL APPEARANCE:  Alert, in no distress, cooperative  ENT:  Mouth normal, moist mucous membranes, normal hearing acuity  EYES:  Conjunctiva and lids normal  RESP:  no respiratory distress, LSC  CV: increased arm edema, no LE edema  SKIN: redness over sacrum, no open areas. Right leg wound covered with wound VAC, intact dressing  NEURO:   No facial asymmetry, speech clear  PSYCH:  oriented X 3, affect and mood normal     SNF labs:   Most Recent 3 CBC's:  Recent Labs   Lab Test 10/30/20  0905   WBC 12.2*   HGB 11.8*   MCV 99        Most Recent 3 BMP's:  Recent Labs   Lab Test 10/30/20  0905      POTASSIUM 3.0*   CHLORIDE 101   CO2 33*   BUN 42*   CR 1.52*   ANIONGAP 4   CR 8.9   *     DISCHARGE PLAN:    Follow up labs: BMP, CBC check ordered for 11/6 and will be reviewed by TCU NP    Medical Follow Up:      Follow up with primary care provider in 2-3 weeks  Follow up with specialist   ---Dr. Livier Blanton, PA-C [facility] Date: " Thursday, November 12th, 2020 Time: 8:00 AM Location: [address] [place], [place] Phone: [phone] Transport: TBD Bring a change of dressings to this appointment    Follow up with consultant oncology, cardiology per home routine      MTM referral needed and placed by this provider: No    Current [place] scheduled appointments:   No future appointments.     Discharge Services: Home Care:  Occupational Therapy, Physical Therapy, Registered Nurse, Home Health Aide and From:  [facility]    Discharge Instructions Verbalized to Patient at Discharge:     Weigh yourself daily in the morning and keep a record. Call your primary clinic: a) if you are more short of breath, or b) if your weight changes more than 3 pounds in one day or more than 5 pounds in one week.     Wound care NPWT [negative pressure wound therapy] Wound Vac at 125 mm/Hg continuous suction. Wound vac dressing change every M-W-F.    DO NOT DRIVE while taking narcotic pain medications.       TOTAL DISCHARGE TIME:   Greater than 30 minutes  Electronically signed by:  JIMMIE Johnson CNP     Documentation of Face to Face and Certification for Home Health Services    I certify that patient: Florentino Kirkland is under my care and that I, or a nurse practitioner or physician's assistant working with me, had a face-to-face encounter that meets the physician face-to-face encounter requirements with this patient on: 11/6/2020.    This encounter with the patient was in whole, or in part, for the following medical condition, which is the primary reason for home health care: weakness after injury with hematoma, surgery and now wound VAC therapy.    I certify that, based on my findings, the following services are medically necessary home health services: Nursing, Occupational Therapy, Physical Therapy and HHA.    My clinical findings support the need for the above services because: Nurse is needed: To assess wound status, vs after changes in  medications or other medical regimen.., Occupational Therapy Services are needed to assess and treat cognitive ability and address ADL safety due to impairment in self care ability., Physical Therapy Services are needed to assess and treat the following functional impairments: weakness. and HHA for personal care assistance    Further, I certify that my clinical findings support that this patient is homebound (i.e. absences from home require considerable and taxing effort and are for medical reasons or [employer] services or infrequently or of short duration when for other reasons) because: Requires assistance of another person or specialized equipment to access medical services because patient: Is unable to exit home safely on own due to: impared mobility due to wound and wound VAC therapies, weakness, pain...    Based on the above findings. I certify that this patient is confined to the home and needs intermittent skilled nursing care, physical therapy and/or speech therapy.  The patient is under my care, and I have initiated the establishment of the plan of care.  This patient will be followed by a physician who will periodically review the plan of care.  Physician/Provider to provide follow up care: No Ref-Primary, Physician    Attending hospital physician (the Medicare certified [facility] provider): JIMMIE Johnson CNP  Physician Signature: See electronic signature associated with these discharge orders.  Date: 11/6/2020

## 2020-11-06 ENCOUNTER — DISCHARGE SUMMARY NURSING HOME (OUTPATIENT)
Dept: GERIATRICS | Facility: CLINIC | Age: 75
End: 2020-11-06
Payer: COMMERCIAL

## 2020-11-06 ENCOUNTER — TELEPHONE (OUTPATIENT)
Dept: GERIATRICS | Facility: CLINIC | Age: 75
End: 2020-11-06

## 2020-11-06 ENCOUNTER — HOSPITAL LABORATORY (OUTPATIENT)
Dept: OTHER | Facility: CLINIC | Age: 75
End: 2020-11-06

## 2020-11-06 VITALS
BODY MASS INDEX: 25.62 KG/M2 | SYSTOLIC BLOOD PRESSURE: 119 MMHG | DIASTOLIC BLOOD PRESSURE: 77 MMHG | HEIGHT: 71 IN | WEIGHT: 183 LBS | RESPIRATION RATE: 16 BRPM | HEART RATE: 88 BPM | OXYGEN SATURATION: 96 % | TEMPERATURE: 97.5 F

## 2020-11-06 DIAGNOSIS — E78.5 DYSLIPIDEMIA: ICD-10-CM

## 2020-11-06 DIAGNOSIS — D62 ANEMIA DUE TO BLOOD LOSS, ACUTE: ICD-10-CM
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
## 2020-11-06 DIAGNOSIS — L89.151 PRESSURE INJURY OF SACRAL REGION, STAGE 1: ICD-10-CM

## 2020-11-06 DIAGNOSIS — N18.30 STAGE 3 CHRONIC KIDNEY DISEASE, UNSPECIFIED WHETHER STAGE 3A OR 3B CKD (H): ICD-10-CM

## 2020-11-06 DIAGNOSIS — R53.81 PHYSICAL DECONDITIONING: ICD-10-CM

## 2020-11-06 DIAGNOSIS — E03.9 HYPOTHYROIDISM, UNSPECIFIED TYPE: ICD-10-CM

## 2020-11-06 DIAGNOSIS — S80.11XD HEMATOMA OF RIGHT LOWER EXTREMITY, SUBSEQUENT ENCOUNTER: ICD-10-CM

## 2020-11-06 DIAGNOSIS — F32.A DEPRESSION, UNSPECIFIED DEPRESSION TYPE: ICD-10-CM

## 2020-11-06 DIAGNOSIS — I48.0 PAF (PAROXYSMAL ATRIAL FIBRILLATION) (H): ICD-10-CM

## 2020-11-06 DIAGNOSIS — I25.119 CORONARY ARTERY DISEASE INVOLVING NATIVE CORONARY ARTERY OF NATIVE HEART WITH ANGINA PECTORIS (H): ICD-10-CM

## 2020-11-06 DIAGNOSIS — I50.22 CHRONIC SYSTOLIC CONGESTIVE HEART FAILURE (H): ICD-10-CM

## 2020-11-06 DIAGNOSIS — F41.9 ANXIETY: ICD-10-CM

## 2020-11-06 DIAGNOSIS — M79.604 PAIN OF RIGHT LOWER EXTREMITY: ICD-10-CM

## 2020-11-06 DIAGNOSIS — I10 ESSENTIAL HYPERTENSION: ICD-10-CM

## 2020-11-06 DIAGNOSIS — I10 ESSENTIAL HYPERTENSION: Primary | ICD-10-CM

## 2020-11-06 DIAGNOSIS — Z85.118 HISTORY OF LUNG CANCER: ICD-10-CM

## 2020-11-06 LAB
ANION GAP SERPL CALCULATED.3IONS-SCNC: 6 MMOL/L (ref 3–14)
BUN SERPL-MCNC: 65 MG/DL (ref 7–30)
CALCIUM SERPL-MCNC: 9.3 MG/DL (ref 8.5–10.1)
CHLORIDE SERPL-SCNC: 99 MMOL/L (ref 94–109)
CO2 SERPL-SCNC: 33 MMOL/L (ref 20–32)
CREAT SERPL-MCNC: 1.71 MG/DL (ref 0.66–1.25)
ERYTHROCYTE [DISTWIDTH] IN BLOOD BY AUTOMATED COUNT: 14.5 % (ref 10–15)
GFR SERPL CREATININE-BSD FRML MDRD: 38 ML/MIN/{1.73_M2}
GLUCOSE SERPL-MCNC: 72 MG/DL (ref 70–99)
HCT VFR BLD AUTO: 37.8 % (ref 40–53)
HGB BLD-MCNC: 11.9 G/DL (ref 13.3–17.7)
MCH RBC QN AUTO: 31.4 PG (ref 26.5–33)
MCHC RBC AUTO-ENTMCNC: 31.5 G/DL (ref 31.5–36.5)
MCV RBC AUTO: 100 FL (ref 78–100)
PLATELET # BLD AUTO: 265 10E9/L (ref 150–450)
POTASSIUM SERPL-SCNC: 2.8 MMOL/L (ref 3.4–5.3)
RBC # BLD AUTO: 3.79 10E12/L (ref 4.4–5.9)
SODIUM SERPL-SCNC: 138 MMOL/L (ref 133–144)
WBC # BLD AUTO: 9.8 10E9/L (ref 4–11)

## 2020-11-06 PROCEDURE — 99316 NF DSCHRG MGMT 30 MIN+: CPT | Performed by: NURSE PRACTITIONER

## 2020-11-06 RX ORDER — LEVOTHYROXINE SODIUM 112 UG/1
112 TABLET ORAL DAILY
Qty: 30 TABLET | Refills: 0 | Status: SHIPPED | OUTPATIENT
Start: 2020-11-06

## 2020-11-06 RX ORDER — ZINC OXIDE 216 MG/ML
LOTION TOPICAL
Qty: 237 ML | Refills: 0 | Status: SHIPPED | OUTPATIENT
Start: 2020-11-06

## 2020-11-06 RX ORDER — OXYCODONE HYDROCHLORIDE 5 MG/1
5-15 TABLET ORAL EVERY 6 HOURS PRN
Qty: 20 TABLET | Refills: 0 | Status: SHIPPED | OUTPATIENT
Start: 2020-11-06

## 2020-11-06 RX ORDER — LIDOCAINE 4 G/G
1 PATCH TOPICAL EVERY 24 HOURS
Qty: 30 PATCH | Refills: 0 | Status: SHIPPED | OUTPATIENT
Start: 2020-11-06

## 2020-11-06 RX ORDER — ZINC OXIDE 216 MG/ML
1 LOTION TOPICAL 2 TIMES DAILY
COMMUNITY
End: 2020-11-06

## 2020-11-06 RX ORDER — HYDRALAZINE HYDROCHLORIDE 10 MG/1
10 TABLET, FILM COATED ORAL 3 TIMES DAILY
Qty: 90 TABLET | Refills: 0 | Status: SHIPPED | OUTPATIENT
Start: 2020-11-06

## 2020-11-06 RX ORDER — BUPROPION HYDROCHLORIDE 300 MG/1
300 TABLET ORAL EVERY MORNING
Qty: 30 TABLET | Refills: 0 | Status: SHIPPED | OUTPATIENT
Start: 2020-11-06

## 2020-11-06 RX ORDER — BUPROPION HYDROCHLORIDE 150 MG/1
150 TABLET, FILM COATED, EXTENDED RELEASE ORAL DAILY
Qty: 30 TABLET | Refills: 0 | Status: SHIPPED | OUTPATIENT
Start: 2020-11-06

## 2020-11-06 RX ORDER — METOLAZONE 5 MG/1
5 TABLET ORAL DAILY
Qty: 30 TABLET | Refills: 0 | Status: SHIPPED | OUTPATIENT
Start: 2020-11-06

## 2020-11-06 RX ORDER — CARVEDILOL 25 MG/1
25 TABLET ORAL 2 TIMES DAILY WITH MEALS
Qty: 60 TABLET | Refills: 0 | Status: SHIPPED | OUTPATIENT
Start: 2020-11-06

## 2020-11-06 RX ORDER — MIRTAZAPINE 15 MG/1
15 TABLET, FILM COATED ORAL AT BEDTIME
Qty: 30 TABLET | Refills: 0 | Status: SHIPPED | OUTPATIENT
Start: 2020-11-06

## 2020-11-06 RX ORDER — ISOSORBIDE MONONITRATE 30 MG/1
30 TABLET, EXTENDED RELEASE ORAL DAILY
Qty: 30 TABLET | Refills: 0 | Status: SHIPPED | OUTPATIENT
Start: 2020-11-06

## 2020-11-06 RX ORDER — ATORVASTATIN CALCIUM 80 MG/1
80 TABLET, FILM COATED ORAL AT BEDTIME
Qty: 30 TABLET | Refills: 0 | Status: SHIPPED | OUTPATIENT
Start: 2020-11-06

## 2020-11-06 ASSESSMENT — MIFFLIN-ST. JEOR: SCORE: 1577.21

## 2020-11-07 NOTE — TELEPHONE ENCOUNTER
Nurse called reporting K 2.8 today, creatinine 1.71. Last K 3.0 on 10/30/2020 and patient received KCl 40 meq X 1 dose. Creatinine 1.52 on 10/30/2020.   Patient on metolazone 5 mg daily and received bumex 1 mg daily with KCl 20 meq daily 11/4-11/6/2020.       Orders given for KCl 40 meq X 1 dose now, then 20 meq daily 11/7 and 11/8/2020.   BMP 11/9/2020 prior to discharge.       Shay SAMUEL CNP

## 2020-11-08 ENCOUNTER — TELEPHONE (OUTPATIENT)
Dept: GERIATRICS | Facility: CLINIC | Age: 75
End: 2020-11-08

## 2020-11-08 NOTE — TELEPHONE ENCOUNTER
Nurse called, also spoke with patient RE: area around wound vac red, irritated, afebrile, T 98.1, progressively worsening in past 3 days, patient concerned, also plans to return home tomorrow with [facility].  -cephalexin 500mg PO TID x7d for potential cellulitis

## 2020-11-09 ENCOUNTER — NURSING HOME VISIT (OUTPATIENT)
Dept: GERIATRICS | Facility: CLINIC | Age: 75
End: 2020-11-09
Payer: COMMERCIAL

## 2020-11-09 ENCOUNTER — HOSPITAL LABORATORY (OUTPATIENT)
Dept: OTHER | Facility: CLINIC | Age: 75
End: 2020-11-09

## 2020-11-09 VITALS
RESPIRATION RATE: 18 BRPM | WEIGHT: 180 LBS | HEIGHT: 71 IN | OXYGEN SATURATION: 96 % | DIASTOLIC BLOOD PRESSURE: 83 MMHG | TEMPERATURE: 96 F | BODY MASS INDEX: 25.2 KG/M2 | SYSTOLIC BLOOD PRESSURE: 126 MMHG | HEART RATE: 89 BPM

## 2020-11-09 DIAGNOSIS — N18.30 STAGE 3 CHRONIC KIDNEY DISEASE, UNSPECIFIED WHETHER STAGE 3A OR 3B CKD (H): ICD-10-CM

## 2020-11-09 DIAGNOSIS — L03.90 WOUND CELLULITIS: Primary | ICD-10-CM

## 2020-11-09 DIAGNOSIS — I48.0 PAF (PAROXYSMAL ATRIAL FIBRILLATION) (H): ICD-10-CM

## 2020-11-09 DIAGNOSIS — S80.11XD HEMATOMA OF RIGHT LOWER EXTREMITY, SUBSEQUENT ENCOUNTER: ICD-10-CM

## 2020-11-09 DIAGNOSIS — E87.6 HYPOKALEMIA: ICD-10-CM

## 2020-11-09 DIAGNOSIS — I50.22 CHRONIC SYSTOLIC CONGESTIVE HEART FAILURE (H): ICD-10-CM

## 2020-11-09 DIAGNOSIS — I10 ESSENTIAL HYPERTENSION: ICD-10-CM

## 2020-11-09 DIAGNOSIS — I25.119 CORONARY ARTERY DISEASE INVOLVING NATIVE CORONARY ARTERY OF NATIVE HEART WITH ANGINA PECTORIS (H): ICD-10-CM

## 2020-11-09 DIAGNOSIS — D62 ANEMIA DUE TO BLOOD LOSS, ACUTE: ICD-10-CM
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
## 2020-11-09 LAB
ANION GAP SERPL CALCULATED.3IONS-SCNC: 4 MMOL/L (ref 3–14)
BUN SERPL-MCNC: 60 MG/DL (ref 7–30)
CALCIUM SERPL-MCNC: 9.2 MG/DL (ref 8.5–10.1)
CHLORIDE SERPL-SCNC: 102 MMOL/L (ref 94–109)
CO2 SERPL-SCNC: 32 MMOL/L (ref 20–32)
CREAT SERPL-MCNC: 1.59 MG/DL (ref 0.66–1.25)
GFR SERPL CREATININE-BSD FRML MDRD: 41 ML/MIN/{1.73_M2}
GLUCOSE SERPL-MCNC: 100 MG/DL (ref 70–99)
POTASSIUM SERPL-SCNC: 2.9 MMOL/L (ref 3.4–5.3)
SODIUM SERPL-SCNC: 138 MMOL/L (ref 133–144)

## 2020-11-09 PROCEDURE — 99309 SBSQ NF CARE MODERATE MDM 30: CPT | Performed by: NURSE PRACTITIONER

## 2020-11-09 ASSESSMENT — MIFFLIN-ST. JEOR: SCORE: 1563.6

## 2020-11-09 NOTE — PROGRESS NOTES
[place] [facility]  [place] [facility] Record Number:  [id number]  Place of Service where encounter took place:  [facility] [[id number]]  Chief Complaint   Patient presents with     RECHECK       HPI:    Florentino Kirkland  is a 75 year old (1945), who is being seen today for an episodic care visit.  HPI information obtained from: facility chart records, facility staff, patient report and [facility] chart review.     Per recent TCU provider progress notes:  75 y.o. male with history of CAD (s/p CABG x3), sCHF (EF 44%), AF on Eliquis, CKD, hypothyroidism, hx DVT, depression and lung cancer hospitalized 10/21/20 with progressive right leg pain and difficulty ambulation following a fall 2 days PTA. Noted a significant right leg hematoma. Hgb 12.6. Imaging without fractures but subcutaneous edema anterior to right knee. Vascular Surgery: held anticoagulation with elevation and ACE wraps of the leg. Patient developed numbness/burning of her leg overnight but Vascular Surgery felt unlikely compartment syndrome. Patient failed ambulation trial 10/22 and so decision was made to perform hematoma evacuation and VAC placement 10/23, Apixaban resumed on 10/24. Pain team consulted for better pain control and needs TCU for rehab. Due to K low at 3.0 lat check received KCL 40 meq PO x 1. Due to arm edema added short term Bumex and KCL x 3 days. BMP recheck due 11/6 showed hypokalemia and worsened Cr level, recheck ordered for today.     Over the weekend developed redness, pustules and drainage surrounding wound. On call updated and started Keflex course x 7 days.     Today's concern is:  Seen today for acute TCU visit due to newly noted skin infection surrounding right upper leg wound as well as hypokalemia and worsened Cr last check. Patient afebrile, weight down 3 lbs since admission, -150/78-87 and sats 96% on room air. To see wound clinic for f/u this week: will update surgeon to change  in status today and look for new orders.     Past Medical and Surgical History reviewed in Epic today.    MEDICATIONS:  Current Outpatient Medications   Medication Sig Dispense Refill     acetaminophen (TYLENOL) 500 MG tablet Take 1,000 mg by mouth 3 times daily       apixaban ANTICOAGULANT (ELIQUIS) 2.5 MG tablet Take 1 tablet (2.5 mg) by mouth 2 times daily 60 tablet 0     atorvastatin (LIPITOR) 80 MG tablet Take 1 tablet (80 mg) by mouth At Bedtime 30 tablet 0     buPROPion (WELLBUTRIN XL) 300 MG 24 hr tablet Take 1 tablet (300 mg) by mouth every morning For depression Take with 150 mg tab for 450 mg dose 30 tablet 0     buPROPion (ZYBAN) 150 MG 12 hr tablet Take 1 tablet (150 mg) by mouth daily For Depression Take with 300 mg tab for 450 mg dose. 30 tablet 0     carvedilol (COREG) 25 MG tablet Take 1 tablet (25 mg) by mouth 2 times daily (with meals) 60 tablet 0     hydrALAZINE (APRESOLINE) 10 MG tablet Take 1 tablet (10 mg) by mouth 3 times daily 90 tablet 0     isosorbide mononitrate (IMDUR) 30 MG 24 hr tablet Take 1 tablet (30 mg) by mouth daily 30 tablet 0     levothyroxine (SYNTHROID/LEVOTHROID) 112 MCG tablet Take 1 tablet (112 mcg) by mouth daily 30 tablet 0     Lidocaine (LIDOCARE) 4 % Patch Place 1 patch onto the skin every 24 hours Apply to affected area topically one time a day for Hematoma right lower extremity and remove per schedule. To prevent lidocaine toxicity, patient should be patch free for 12 hrs daily. 30 patch 0     metolazone (ZAROXOLYN) 5 MG tablet Take 1 tablet (5 mg) by mouth daily 30 tablet 0     mirtazapine (REMERON) 15 MG tablet Take 1 tablet (15 mg) by mouth At Bedtime 30 tablet 0     nitroGLYcerin (NITROSTAT) 0.4 MG sublingual tablet Place 0.4 mg under the tongue every 5 minutes as needed for chest pain For chest pain place 1 tablet under the tongue every 5 minutes for 3 doses. If symptoms persist 5 minutes after 1st dose call 911.       Nutritional Supplement LIQD Gutierrez 30 ml by  "mouth twice daily 237 mL 0     oxyCODONE (ROXICODONE) 5 MG tablet Take 1-3 tablets (5-15 mg) by mouth every 6 hours as needed for severe pain Give 5 mg by mouth every 4 hours as needed for Pain 1-5/10. Give 10 mg by mouth every 4 hours as needed for Pain 6-8/10. Give 15 mg by mouth every 4 hours as needed for Pain 9-10/10 20 tablet 0     polyethylene glycol (MIRALAX) 17 g packet Take 1 packet by mouth daily       senna-docusate (SENOKOT-S/PERICOLACE) 8.6-50 MG tablet Take 2 tablets by mouth 2 times daily         REVIEW OF SYSTEMS:  4 point ROS including Respiratory, CV, GI and , other than that noted in the HPI,  is negative    Objective:  /83   Pulse 89   Temp 96  F (35.6  C)   Resp 18   Ht 1.803 m (5' 11\")   Wt 81.6 kg (180 lb)   SpO2 96%   BMI 25.10 kg/m    Exam:  Exam is limited due to COVID-19 precautions  GENERAL APPEARANCE:  Alert, in no distress, cooperative  ENT:  Mouth normal, moist mucous membranes, normal hearing acuity  EYES:  Conjunctiva and lids normal  RESP:  no respiratory distress  CV: lymphedema left arm, no LE edema  SKIN: newly noted redness, pustules and drainage surrounding surgical wound with increased redness, warmth and pain.   NEURO:   No facial asymmetry, speech clear  PSYCH:  oriented X 3, affect and mood normal           Labs:   CBC RESULTS:   Recent Labs   Lab Test 11/06/20  1100 10/30/20  0905   WBC 9.8 12.2*   RBC 3.79* 3.76*   HGB 11.9* 11.8*   HCT 37.8* 37.2*    99   MCH 31.4 31.4   MCHC 31.5 31.7   RDW 14.5 14.1    293       Last Basic Metabolic Panel:  Recent Labs   Lab Test 11/09/20  1000 11/06/20  1100    138   POTASSIUM 2.9* 2.8*   CHLORIDE 102 99   CR 9.2 9.3   CO2 32 33*   BUN 60* 65*   CR 1.59* 1.71*   * 72       ASSESSMENT/PLAN:  Wound cellulitis  Hematoma of right lower extremity, subsequent encounter  Fall, subsequent encounter  Acute issue, now developed roz-wound cellulitis and started on Keflex over the weekend. CBC " tomorrow, no fevers at this time. Staff to update surgeon ASAP. F/U with surgeon arranged for 11/12 this week. Discharge to be postponed at this time.     Anemia due to blood loss, acute  Acute, stable Hgb last check. CBC on 11/10 and f/u with results.     Coronary artery disease involving native coronary artery of native heart with angina pectoris (H)  Chronic systolic congestive heart failure (H)  PAF (paroxysmal atrial fibrillation) (H)  Essential hypertension  Chronic issues, stable. Meds, vs, wt as ordered and f/u with ranges next visit.     Hypokalemia  Stage 3 chronic kidney disease, unspecified whether stage 3a or 3b CKD  Acute on chronic issues. Improved Cr on recheck today but hypokalemia persists. KCL twice today, repeat BMP on 11/10.     Orders written by provider at facility  1. Call wound clinic stat to update re: new cellulitis surrounding wound, on Keflex, ?new orders.   2. CBC with diff and BMP on 11/10.   3. KCL 40 meq PO BID today 11/9    Electronically signed by:  JIMMIE Johnson CNP

## 2020-11-09 NOTE — PATIENT INSTRUCTIONS
[place] [facility] TELEPHONE ENCOUNTER    Chief Complaint   Patient presents with     RECHECK       Florentino Kirkland is a 75 year old  (1945),Nurse called today to report: lab results    ASSESSMENT/PLAN  hypokalemia    KCL 40 meq PO BID today only and BMP check 11/10 as planned.       Electronically signed by:   JIMMIE Johnson CNP

## 2020-11-10 ENCOUNTER — HOSPITAL LABORATORY (OUTPATIENT)
Dept: OTHER | Facility: CLINIC | Age: 75
End: 2020-11-10

## 2020-11-10 LAB
ANION GAP SERPL CALCULATED.3IONS-SCNC: 4 MMOL/L (ref 3–14)
BASOPHILS # BLD AUTO: 0 10E9/L (ref 0–0.2)
BASOPHILS NFR BLD AUTO: 0.1 %
BUN SERPL-MCNC: 55 MG/DL (ref 7–30)
CALCIUM SERPL-MCNC: 9.5 MG/DL (ref 8.5–10.1)
CHLORIDE SERPL-SCNC: 104 MMOL/L (ref 94–109)
CO2 SERPL-SCNC: 31 MMOL/L (ref 20–32)
CREAT SERPL-MCNC: 1.52 MG/DL (ref 0.66–1.25)
DIFFERENTIAL METHOD BLD: ABNORMAL
EOSINOPHIL # BLD AUTO: 0.3 10E9/L (ref 0–0.7)
EOSINOPHIL NFR BLD AUTO: 3 %
ERYTHROCYTE [DISTWIDTH] IN BLOOD BY AUTOMATED COUNT: 14.3 % (ref 10–15)
GFR SERPL CREATININE-BSD FRML MDRD: 44 ML/MIN/{1.73_M2}
GLUCOSE SERPL-MCNC: 108 MG/DL (ref 70–99)
HCT VFR BLD AUTO: 39.8 % (ref 40–53)
HGB BLD-MCNC: 12.5 G/DL (ref 13.3–17.7)
IMM GRANULOCYTES # BLD: 0 10E9/L (ref 0–0.4)
IMM GRANULOCYTES NFR BLD: 0.2 %
LYMPHOCYTES # BLD AUTO: 1.1 10E9/L (ref 0.8–5.3)
LYMPHOCYTES NFR BLD AUTO: 9.6 %
MCH RBC QN AUTO: 31.1 PG (ref 26.5–33)
MCHC RBC AUTO-ENTMCNC: 31.4 G/DL (ref 31.5–36.5)
MCV RBC AUTO: 99 FL (ref 78–100)
MONOCYTES # BLD AUTO: 1 10E9/L (ref 0–1.3)
MONOCYTES NFR BLD AUTO: 9.1 %
NEUTROPHILS # BLD AUTO: 8.7 10E9/L (ref 1.6–8.3)
NEUTROPHILS NFR BLD AUTO: 78 %
NRBC # BLD AUTO: 0 10*3/UL
NRBC BLD AUTO-RTO: 0 /100
PLATELET # BLD AUTO: 228 10E9/L (ref 150–450)
POTASSIUM SERPL-SCNC: 3.3 MMOL/L (ref 3.4–5.3)
RBC # BLD AUTO: 4.02 10E12/L (ref 4.4–5.9)
SODIUM SERPL-SCNC: 139 MMOL/L (ref 133–144)
WBC # BLD AUTO: 11.2 10E9/L (ref 4–11)

## 2020-11-11 ENCOUNTER — NURSING HOME VISIT (OUTPATIENT)
Dept: GERIATRICS | Facility: CLINIC | Age: 75
End: 2020-11-11
Payer: COMMERCIAL

## 2020-11-11 VITALS
HEART RATE: 81 BPM | BODY MASS INDEX: 25.2 KG/M2 | HEIGHT: 71 IN | DIASTOLIC BLOOD PRESSURE: 81 MMHG | TEMPERATURE: 98 F | OXYGEN SATURATION: 94 % | WEIGHT: 180 LBS | SYSTOLIC BLOOD PRESSURE: 130 MMHG | RESPIRATION RATE: 18 BRPM

## 2020-11-11 DIAGNOSIS — I50.22 CHRONIC SYSTOLIC CONGESTIVE HEART FAILURE (H): ICD-10-CM

## 2020-11-11 DIAGNOSIS — S80.11XD HEMATOMA OF RIGHT LOWER EXTREMITY, SUBSEQUENT ENCOUNTER: ICD-10-CM

## 2020-11-11 DIAGNOSIS — D62 ANEMIA DUE TO BLOOD LOSS, ACUTE: ICD-10-CM

## 2020-11-11 DIAGNOSIS — L03.90 WOUND CELLULITIS: Primary | ICD-10-CM

## 2020-11-11 DIAGNOSIS — I25.119 CORONARY ARTERY DISEASE INVOLVING NATIVE CORONARY ARTERY OF NATIVE HEART WITH ANGINA PECTORIS (H): ICD-10-CM

## 2020-11-11 DIAGNOSIS — I48.0 PAF (PAROXYSMAL ATRIAL FIBRILLATION) (H): ICD-10-CM

## 2020-11-11 DIAGNOSIS — I10 ESSENTIAL HYPERTENSION: ICD-10-CM

## 2020-11-11 DIAGNOSIS — N18.30 STAGE 3 CHRONIC KIDNEY DISEASE, UNSPECIFIED WHETHER STAGE 3A OR 3B CKD (H): ICD-10-CM
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
## 2020-11-11 DIAGNOSIS — E87.6 HYPOKALEMIA: ICD-10-CM

## 2020-11-11 LAB
SARS-COV-2 RNA SPEC QL NAA+PROBE: NOT DETECTED
SPECIMEN SOURCE: NORMAL

## 2020-11-11 PROCEDURE — 99309 SBSQ NF CARE MODERATE MDM 30: CPT | Performed by: NURSE PRACTITIONER

## 2020-11-11 RX ORDER — POTASSIUM CHLORIDE 1500 MG/1
20 TABLET, EXTENDED RELEASE ORAL DAILY
COMMUNITY
Start: 2020-11-11 | End: 2020-11-13

## 2020-11-11 RX ORDER — CEPHALEXIN 500 MG/1
500 CAPSULE ORAL 3 TIMES DAILY
COMMUNITY
Start: 2020-11-11 | End: 2020-11-15

## 2020-11-11 ASSESSMENT — MIFFLIN-ST. JEOR: SCORE: 1563.6

## 2020-11-11 NOTE — PROGRESS NOTES
[place] [facility]  [place] [facility] Record Number:  [id number]  Place of Service where encounter took place:  [facility] ([facility] [[id number]]  Chief Complaint   Patient presents with     RECHECK       HPI:    Florentino Kirkland  is a 75 year old (1945), who is being seen today for an episodic care visit.  HPI information obtained from: facility chart records, facility staff, patient report and [facility] chart review.     Per recent TCU provider progress notes:  75 y.o. male with history of CAD (s/p CABG x3), sCHF (EF 44%), AF on Eliquis, CKD, hypothyroidism, hx DVT, depression and lung cancer hospitalized 10/21/20 with progressive right leg pain and difficulty ambulation following a fall 2 days PTA. Noted a significant right leg hematoma. Hgb 12.6. Imaging without fractures but subcutaneous edema anterior to right knee. Vascular Surgery: held anticoagulation with elevation and ACE wraps of the leg. Patient developed numbness/burning of her leg overnight but Vascular Surgery felt unlikely compartment syndrome. Patient failed ambulation trial 10/22 and so decision was made to perform hematoma evacuation and VAC placement 10/23, Apixaban resumed on 10/24. Pain team consulted for better pain control and needs TCU for rehab. Due to K low at 3.0 lat check received KCL 40 meq PO x 1. Due to arm edema added short term Bumex and KCL x 3 days. BMP recheck due 11/6 showed hypokalemia and worsened Cr level with recheck on 11/9 showing Cr 1.59 and K 2.9. Received KCL 40 meq PO BID on 11/9.     Due to new cellulitis surrounding right thigh wound was started on Keflex 500 mg TID x 7 day course over the weekend. Wound clinic notified - OK with antibiotic plan, will f/u with patient at clinic on 11/12. Discharge on hold at this time.     Today's concern is:  Patient is seen for episodic TCU visit. Reports pain managed well, no fevers. No headaches, dizziness, chest pain, bowel or bladder issues. Per EMR  note weight up 2 lbs since admission. BP range 125-131/80s and HR 70-90s. Sats 94% on room air. Walks 140 ft with 2WW with therapies. Right upper leg area of cellulitis less red, decreased purulent drainage.     Past Medical and Surgical History reviewed in Epic today.    MEDICATIONS:  Current Outpatient Medications   Medication Sig Dispense Refill     acetaminophen (TYLENOL) 500 MG tablet Take 1,000 mg by mouth 3 times daily       apixaban ANTICOAGULANT (ELIQUIS) 2.5 MG tablet Take 1 tablet (2.5 mg) by mouth 2 times daily 60 tablet 0     atorvastatin (LIPITOR) 80 MG tablet Take 1 tablet (80 mg) by mouth At Bedtime 30 tablet 0     buPROPion (WELLBUTRIN XL) 300 MG 24 hr tablet Take 1 tablet (300 mg) by mouth every morning For depression Take with 150 mg tab for 450 mg dose 30 tablet 0     buPROPion (ZYBAN) 150 MG 12 hr tablet Take 1 tablet (150 mg) by mouth daily For Depression Take with 300 mg tab for 450 mg dose. 30 tablet 0     carvedilol (COREG) 25 MG tablet Take 1 tablet (25 mg) by mouth 2 times daily (with meals) 60 tablet 0     cephALEXin (KEFLEX) 500 MG capsule Take 500 mg by mouth 3 times daily       hydrALAZINE (APRESOLINE) 10 MG tablet Take 1 tablet (10 mg) by mouth 3 times daily 90 tablet 0     isosorbide mononitrate (IMDUR) 30 MG 24 hr tablet Take 1 tablet (30 mg) by mouth daily 30 tablet 0     levothyroxine (SYNTHROID/LEVOTHROID) 112 MCG tablet Take 1 tablet (112 mcg) by mouth daily 30 tablet 0     Lidocaine (LIDOCARE) 4 % Patch Place 1 patch onto the skin every 24 hours Apply to affected area topically one time a day for Hematoma right lower extremity and remove per schedule. To prevent lidocaine toxicity, patient should be patch free for 12 hrs daily. 30 patch 0     metolazone (ZAROXOLYN) 5 MG tablet Take 1 tablet (5 mg) by mouth daily 30 tablet 0     mirtazapine (REMERON) 15 MG tablet Take 1 tablet (15 mg) by mouth At Bedtime 30 tablet 0     nitroGLYcerin (NITROSTAT) 0.4 MG sublingual tablet Place  "0.4 mg under the tongue every 5 minutes as needed for chest pain For chest pain place 1 tablet under the tongue every 5 minutes for 3 doses. If symptoms persist 5 minutes after 1st dose call 911.       Nutritional Supplement LIQD Gutierrez 30 ml by mouth twice daily 237 mL 0     oxyCODONE (ROXICODONE) 5 MG tablet Take 1-3 tablets (5-15 mg) by mouth every 6 hours as needed for severe pain Give 5 mg by mouth every 4 hours as needed for Pain 1-5/10. Give 10 mg by mouth every 4 hours as needed for Pain 6-8/10. Give 15 mg by mouth every 4 hours as needed for Pain 9-10/10 20 tablet 0     polyethylene glycol (MIRALAX) 17 g packet Take 1 packet by mouth daily       potassium chloride ER (KLOR-CON M) 20 MEQ CR tablet Take 20 mEq by mouth daily       senna-docusate (SENOKOT-S/PERICOLACE) 8.6-50 MG tablet Take 2 tablets by mouth 2 times daily         REVIEW OF SYSTEMS:  4 point ROS including Respiratory, CV, GI and , other than that noted in the HPI,  is negative    Objective:  /81   Pulse 81   Temp 98  F (36.7  C)   Resp 18   Ht 1.803 m (5' 11\")   Wt 81.6 kg (180 lb)   SpO2 94%   BMI 25.10 kg/m    Exam:  Exam is limited due to COVID-19 precautions  GENERAL APPEARANCE:  Alert, in no distress, cooperative  ENT:  Mouth normal, moist mucous membranes, normal hearing acuity  EYES:  Conjunctiva and lids normal  RESP:  no respiratory distress, on room air  CV: left arm lymphedema - compression garment in place.   NEURO:   No facial asymmetry, speech clear  PSYCH:  oriented X 3, affect and mood normal     Labs:   Most Recent 3 CBC's:  Recent Labs   Lab Test 11/10/20  0610 11/06/20  1100 10/30/20  0905   WBC 11.2* 9.8 12.2*   HGB 12.5* 11.9* 11.8*   MCV 99 100 99    265 293     Most Recent 3 BMP's:  Recent Labs   Lab Test 11/10/20  0610 11/09/20  1000 11/06/20  1100    138 138   POTASSIUM 3.3* 2.9* 2.8*   CHLORIDE 104 102 99   CO2 31 32 33*   BUN 55* 60* 65*   CR 1.52* 1.59* 1.71*   ANIONGAP 4 4 6   CR 9.5 9.2 " 9.3   * 100* 72       ASSESSMENT/PLAN:  Wound cellulitis  Hematoma of right lower extremity, subsequent encounter  Fall, subsequent encounter  Acute issues, developed roz-wound cellulitis and started on Keflex over the weekend. WBC 11.2 from 9.8 last check, no fevers at this time. Wound clinic/surgeon aware. F/U with surgeon arranged for 11/12. Discharge to be postponed at this time. Area of infection appears smaller, dryer and less red. Monitor.     Anemia due to blood loss, acute  Acute, improved Hgb last check. CBC on 11/13 and f/u with results.     Coronary artery disease involving native coronary artery of native heart with angina pectoris (H)  Chronic systolic congestive heart failure (H)  PAF (paroxysmal atrial fibrillation) (H)  Essential hypertension  Chronic issues, stable. Meds, vs, wt as ordered and f/u with ranges next visit.     Hypokalemia  Stage 3 chronic kidney disease, unspecified whether stage 3a or 3b CKD  Acute on chronic issues. Improved Cr on recheck today; hypokalemia improved but persists. KCL 20 meq daily, BMP 11/13.     Orders written by provider at facility  1. KCL 20 meq PO daily diagnosis hypokalemia  2. CBC, BMP check 11/13 diagnosis cellulitis, CKD  3. Wound clinic f/u as scheduled 11/12 and review recommendations.     Electronically signed by:  JIMMIE Johnson CNP

## 2020-11-13 ENCOUNTER — NURSING HOME VISIT (OUTPATIENT)
Dept: GERIATRICS | Facility: CLINIC | Age: 75
End: 2020-11-13
Payer: COMMERCIAL

## 2020-11-13 ENCOUNTER — TELEPHONE (OUTPATIENT)
Dept: GERIATRICS | Facility: CLINIC | Age: 75
End: 2020-11-13

## 2020-11-13 ENCOUNTER — HOSPITAL LABORATORY (OUTPATIENT)
Dept: OTHER | Facility: CLINIC | Age: 75
End: 2020-11-13

## 2020-11-13 VITALS
BODY MASS INDEX: 25.48 KG/M2 | WEIGHT: 182 LBS | RESPIRATION RATE: 18 BRPM | DIASTOLIC BLOOD PRESSURE: 84 MMHG | OXYGEN SATURATION: 98 % | HEART RATE: 78 BPM | SYSTOLIC BLOOD PRESSURE: 134 MMHG | TEMPERATURE: 97.5 F | HEIGHT: 71 IN

## 2020-11-13 DIAGNOSIS — L03.90 WOUND CELLULITIS: Primary | ICD-10-CM

## 2020-11-13 DIAGNOSIS — N18.30 STAGE 3 CHRONIC KIDNEY DISEASE, UNSPECIFIED WHETHER STAGE 3A OR 3B CKD (H): ICD-10-CM

## 2020-11-13 DIAGNOSIS — I50.22 CHRONIC SYSTOLIC CONGESTIVE HEART FAILURE (H): ICD-10-CM

## 2020-11-13 DIAGNOSIS — D62 ANEMIA DUE TO BLOOD LOSS, ACUTE: ICD-10-CM

## 2020-11-13 DIAGNOSIS — S80.11XD HEMATOMA OF RIGHT LOWER EXTREMITY, SUBSEQUENT ENCOUNTER: ICD-10-CM

## 2020-11-13 DIAGNOSIS — I10 ESSENTIAL HYPERTENSION: ICD-10-CM

## 2020-11-13 DIAGNOSIS — E87.6 HYPOKALEMIA: ICD-10-CM

## 2020-11-13 DIAGNOSIS — I25.119 CORONARY ARTERY DISEASE INVOLVING NATIVE CORONARY ARTERY OF NATIVE HEART WITH ANGINA PECTORIS (H): ICD-10-CM

## 2020-11-13 DIAGNOSIS — I48.0 PAF (PAROXYSMAL ATRIAL FIBRILLATION) (H): ICD-10-CM

## 2020-11-13 DIAGNOSIS — R53.81 PHYSICAL DECONDITIONING: ICD-10-CM

## 2020-11-13 DIAGNOSIS — E87.6 HYPOKALEMIA: Primary | ICD-10-CM
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
## 2020-11-13 DIAGNOSIS — M79.604 ACUTE PAIN OF LOWER EXTREMITY, RIGHT: ICD-10-CM

## 2020-11-13 LAB
ANION GAP SERPL CALCULATED.3IONS-SCNC: 6 MMOL/L (ref 3–14)
BUN SERPL-MCNC: 54 MG/DL (ref 7–30)
CALCIUM SERPL-MCNC: 9 MG/DL (ref 8.5–10.1)
CHLORIDE SERPL-SCNC: 103 MMOL/L (ref 94–109)
CO2 SERPL-SCNC: 31 MMOL/L (ref 20–32)
CREAT SERPL-MCNC: 1.4 MG/DL (ref 0.66–1.25)
ERYTHROCYTE [DISTWIDTH] IN BLOOD BY AUTOMATED COUNT: 13.8 % (ref 10–15)
GFR SERPL CREATININE-BSD FRML MDRD: 48 ML/MIN/{1.73_M2}
GLUCOSE SERPL-MCNC: 104 MG/DL (ref 70–99)
HCT VFR BLD AUTO: 37.7 % (ref 40–53)
HGB BLD-MCNC: 11.9 G/DL (ref 13.3–17.7)
MCH RBC QN AUTO: 31.2 PG (ref 26.5–33)
MCHC RBC AUTO-ENTMCNC: 31.6 G/DL (ref 31.5–36.5)
MCV RBC AUTO: 99 FL (ref 78–100)
PLATELET # BLD AUTO: 264 10E9/L (ref 150–450)
POTASSIUM SERPL-SCNC: 2.9 MMOL/L (ref 3.4–5.3)
RBC # BLD AUTO: 3.82 10E12/L (ref 4.4–5.9)
SODIUM SERPL-SCNC: 140 MMOL/L (ref 133–144)
WBC # BLD AUTO: 7.1 10E9/L (ref 4–11)

## 2020-11-13 PROCEDURE — 99309 SBSQ NF CARE MODERATE MDM 30: CPT | Performed by: NURSE PRACTITIONER

## 2020-11-13 RX ORDER — POTASSIUM CHLORIDE 1500 MG/1
20 TABLET, EXTENDED RELEASE ORAL DAILY
Qty: 30 TABLET | Refills: 0 | Status: SHIPPED | OUTPATIENT
Start: 2020-11-13

## 2020-11-13 ASSESSMENT — MIFFLIN-ST. JEOR: SCORE: 1572.68

## 2020-11-13 NOTE — PROGRESS NOTES
[facility]  [facility] Record Number:  [id number]  Place of Service where encounter took place:  [facility] [[id number]]  Chief Complaint   Patient presents with     RECHECK       HPI:    Florentino Kirkland  is a 75 year old (1945), who is being seen today for an episodic care visit.  HPI information obtained from: facility chart records, facility staff, patient report and [facility] chart review.     Per recent TCU provider progress notes:  75 y.o. male with history of CAD (s/p CABG x3), sCHF (EF 44%), AF on Eliquis, CKD, hypothyroidism, hx DVT, depression and lung cancer hospitalized 10/21/20 with progressive right leg pain and difficulty ambulation following a fall 2 days PTA. Noted a significant right leg hematoma. Hgb 12.6. Imaging without fractures but subcutaneous edema anterior to right knee. Vascular Surgery: held anticoagulation with elevation and ACE wraps of the leg. Patient developed numbness/burning of her leg overnight but Vascular Surgery felt unlikely compartment syndrome. Patient failed ambulation trial 10/22 and so decision was made to perform hematoma evacuation and VAC placement 10/23, Apixaban resumed on 10/24. Pain team consulted for better pain control and needs TCU for rehab. Due to K low at 3.0 lat check received KCL 40 meq PO x 1. Due to arm edema added short term Bumex and KCL x 3 days. BMP recheck due 11/6 showed hypokalemia and worsened Cr level with recheck on 11/9 showing Cr 1.59 and K 2.9. Received KCL 40 meq PO BID on 11/9, then started on KCL 20 meq daily.     Due to new cellulitis surrounding right thigh wound was started on Keflex 500 mg TID x 7 day course over the weekend. Wound clinic notified - OK with antibiotic plan, will f/u with patient at clinic on 11/12. Discharge postponed, saw wound clinic on 11/12 for follow up. Cellulitis resolving, wound healing. To continue wound VAC as ordered, treat right roz-wound blister with  mepilex and change three times weekly with VAC dressing until healed.  To continue Keflex course through 11/15 as ordered.     Today's concern is:  Patient seen for follow up TCU visit. Feeling OK, no new complaints. No headaches, dizziness, chest pain, dyspnea, bowel or bladder issues. Right leg wound covered with wound vac, redness of surrounding skin decreased and blisters healing. Weight up 4 lbs since admission and has ongoing left arm lymphedema. BP range 127-134/75-84 and sats 98% on room air. Plan is to discharge home on 11/14 with all discharge orders as previously written as well as hospital bed which is required for alleviation of pain and due to CHF.     Past Medical and Surgical History reviewed in Epic today.    MEDICATIONS:  Current Outpatient Medications   Medication Sig Dispense Refill     acetaminophen (TYLENOL) 500 MG tablet Take 1,000 mg by mouth 3 times daily       apixaban ANTICOAGULANT (ELIQUIS) 2.5 MG tablet Take 1 tablet (2.5 mg) by mouth 2 times daily 60 tablet 0     atorvastatin (LIPITOR) 80 MG tablet Take 1 tablet (80 mg) by mouth At Bedtime 30 tablet 0     buPROPion (WELLBUTRIN XL) 300 MG 24 hr tablet Take 1 tablet (300 mg) by mouth every morning For depression Take with 150 mg tab for 450 mg dose 30 tablet 0     buPROPion (ZYBAN) 150 MG 12 hr tablet Take 1 tablet (150 mg) by mouth daily For Depression Take with 300 mg tab for 450 mg dose. 30 tablet 0     carvedilol (COREG) 25 MG tablet Take 1 tablet (25 mg) by mouth 2 times daily (with meals) 60 tablet 0     cephALEXin (KEFLEX) 500 MG capsule Take 500 mg by mouth 3 times daily       hydrALAZINE (APRESOLINE) 10 MG tablet Take 1 tablet (10 mg) by mouth 3 times daily 90 tablet 0     isosorbide mononitrate (IMDUR) 30 MG 24 hr tablet Take 1 tablet (30 mg) by mouth daily 30 tablet 0     levothyroxine (SYNTHROID/LEVOTHROID) 112 MCG tablet Take 1 tablet (112 mcg) by mouth daily 30 tablet 0     Lidocaine (LIDOCARE) 4 % Patch Place 1 patch onto  "the skin every 24 hours Apply to affected area topically one time a day for Hematoma right lower extremity and remove per schedule. To prevent lidocaine toxicity, patient should be patch free for 12 hrs daily. 30 patch 0     metolazone (ZAROXOLYN) 5 MG tablet Take 1 tablet (5 mg) by mouth daily 30 tablet 0     mirtazapine (REMERON) 15 MG tablet Take 1 tablet (15 mg) by mouth At Bedtime 30 tablet 0     nitroGLYcerin (NITROSTAT) 0.4 MG sublingual tablet Place 0.4 mg under the tongue every 5 minutes as needed for chest pain For chest pain place 1 tablet under the tongue every 5 minutes for 3 doses. If symptoms persist 5 minutes after 1st dose call 911.       Nutritional Supplement LIQD Gutierrez 30 ml by mouth twice daily 237 mL 0     oxyCODONE (ROXICODONE) 5 MG tablet Take 1-3 tablets (5-15 mg) by mouth every 6 hours as needed for severe pain Give 5 mg by mouth every 4 hours as needed for Pain 1-5/10. Give 10 mg by mouth every 4 hours as needed for Pain 6-8/10. Give 15 mg by mouth every 4 hours as needed for Pain 9-10/10 20 tablet 0     polyethylene glycol (MIRALAX) 17 g packet Take 1 packet by mouth daily       potassium chloride ER (KLOR-CON M) 20 MEQ CR tablet Take 20 mEq by mouth daily       senna-docusate (SENOKOT-S/PERICOLACE) 8.6-50 MG tablet Take 2 tablets by mouth 2 times daily         REVIEW OF SYSTEMS:  4 point ROS including Respiratory, CV, GI and , other than that noted in the HPI,  is negative    Objective:  /84   Pulse 78   Temp 97.5  F (36.4  C)   Resp 18   Ht 1.803 m (5' 11\")   Wt 82.6 kg (182 lb)   SpO2 98%   BMI 25.38 kg/m    Exam:  Exam is limited due to COVID-19 precautions  GENERAL APPEARANCE:  Alert, in no distress, cooperative  ENT:  Mouth normal, moist mucous membranes, normal hearing acuity  EYES:  Conjunctiva and lids normal  RESP:  no respiratory distress, on room air  CV: left arm lymphedema - compression garment in place.   NEURO:   No facial asymmetry, speech clear  PSYCH:  " oriented X 3, affect and mood normal     Labs:   11/13/20 CBC and BMP pending today.     Hospital Laboratory on 11/10/2020   Component Date Value Ref Range Status     Sodium 11/10/2020 139  133 - 144 mmol/L Final     Potassium 11/10/2020 3.3* 3.4 - 5.3 mmol/L Final     Chloride 11/10/2020 104  94 - 109 mmol/L Final     Carbon Dioxide 11/10/2020 31  20 - 32 mmol/L Final     Anion Gap 11/10/2020 4  3 - 14 mmol/L Final     Glucose 11/10/2020 108* 70 - 99 mg/dL Final     Urea Nitrogen 11/10/2020 55* 7 - 30 mg/dL Final     Creatinine 11/10/2020 1.52* 0.66 - 1.25 mg/dL Final     GFR Estimate 11/10/2020 44* >60 mL/min/[1.73_m2] Final    Comment: Non  GFR Calc  Starting 12/18/2018, serum creatinine based estimated GFR (eGFR) will be   calculated using the Chronic Kidney Disease Epidemiology Collaboration   (CKD-EPI) equation.       GFR Estimate If Black 11/10/2020 50* >60 mL/min/[1.73_m2] Final    Comment:  GFR Calc  Starting 12/18/2018, serum creatinine based estimated GFR (eGFR) will be   calculated using the Chronic Kidney Disease Epidemiology Collaboration   (CKD-EPI) equation.       Calcium 11/10/2020 9.5  8.5 - 10.1 mg/dL Final     WBC 11/10/2020 11.2* 4.0 - 11.0 10e9/L Final     RBC Count 11/10/2020 4.02* 4.4 - 5.9 10e12/L Final     Hemoglobin 11/10/2020 12.5* 13.3 - 17.7 g/dL Final     Hematocrit 11/10/2020 39.8* 40.0 - 53.0 % Final     MCV 11/10/2020 99  78 - 100 fl Final     MCH 11/10/2020 31.1  26.5 - 33.0 pg Final     MCHC 11/10/2020 31.4* 31.5 - 36.5 g/dL Final     RDW 11/10/2020 14.3  10.0 - 15.0 % Final     Platelet Count 11/10/2020 228  150 - 450 10e9/L Final     Diff Method 11/10/2020 Automated Method   Final     % Neutrophils 11/10/2020 78.0  % Final     % Lymphocytes 11/10/2020 9.6  % Final     % Monocytes 11/10/2020 9.1  % Final     % Eosinophils 11/10/2020 3.0  % Final     % Basophils 11/10/2020 0.1  % Final     % Immature Granulocytes 11/10/2020 0.2  % Final      Nucleated RBCs 11/10/2020 0  0 /100 Final     Absolute Neutrophil 11/10/2020 8.7* 1.6 - 8.3 10e9/L Final     Absolute Lymphocytes 11/10/2020 1.1  0.8 - 5.3 10e9/L Final     Absolute Monocytes 11/10/2020 1.0  0.0 - 1.3 10e9/L Final     Absolute Eosinophils 11/10/2020 0.3  0.0 - 0.7 10e9/L Final     Absolute Basophils 11/10/2020 0.0  0.0 - 0.2 10e9/L Final     Abs Immature Granulocytes 11/10/2020 0.0  0 - 0.4 10e9/L Final     Absolute Nucleated RBC 11/10/2020 0.0   Final     COVID-19 Virus PCR to [facility] - So* 11/10/2020 Nasopharyngeal   Final     COVID-19 Virus PCR to [facility] - Re* 11/10/2020 Not Detected   Final     ASSESSMENT/PLAN:  Wound cellulitis  Hematoma of right lower extremity, subsequent encounter  Fall, subsequent encounter  Acute pain right lower extremity  Acute issues, developed roz-wound cellulitis and started on Keflex over the weekend. WBC 11.2 from 9.8 last check, no fevers at this time. Wound clinic/surgeon aware - healing, complete antibiotics as ordered. F/U surgeon 3-4 weeks. Discharge home as previously planned with home care and also hospital semi-electric bed for alleviation of pain, CHF. Area of infection appears smaller, dryer and less red. Monitor. Continue pain meds as ordered.     Anemia due to blood loss, acute  Acute, improved Hgb last check. CBC on 11/13 and f/u with results.     Coronary artery disease involving native coronary artery of native heart with angina pectoris (H)  Chronic systolic congestive heart failure (H)  PAF (paroxysmal atrial fibrillation) (H)  Essential hypertension  Chronic issues, stable. Meds, vs, wt as ordered and f/u with ranges next visit.     Hypokalemia  Stage 3 chronic kidney disease, unspecified whether stage 3a or 3b CKD  Acute on chronic issues. Improved Cr on last recheck; hypokalemia improved but persists. KCL 20 meq daily, BMP 11/13. Repeat BMP at PCP follow up.     Orders written by provider at facility  May discharge home on 11/14 or when ready  with home care orders as previously planned with hospital semi-electric bed for alleviation of pain, CHF  F/U PCP 1-2 weeks for BMP recheck  F/U wound clinic as recommended 3-4 weeks  Complete course of Keflex - facility may send remaninig antibiotics home with patient  KCL 20 meq PO daily to continue - script sent to patient's pharmacy    Electronically signed by:  JIMMIE Johnson CNP       Face to Face and Medical Necessity Statement for DME Provider visit    Demographic Information on Florentino Kirkland:  Gender: male  : 1945  [address]  [facility] [address]  [phone] (home)     Medical Record: [id number]  Social Security Number: [id number]  Primary Care Provider: No Ref-Primary, Physician  Insurance: Payor: [facility] / Plan: [employer] MEDICARE [facility] [employer]ERS / Product Type: HMO /     HPI:   Florentino Kirkland is a 75 year old  (1945), who is being seen today for a face to face provider visit at [facility]; medical necessity statement for DME included. This patient requires the following:  DME Ordered and Medical Necessity Statement   Hospital bed: semi electronic     My patient requires for the alleviation of pain, positioning of the body in ways not feasible with an ordinary bed.  He requires a bed height different than that of a fixed height bed to permit transfers from bed to chair, wheelchair or standing position.  He also requires frequent or immediate changes in body position due to CHF, edema and pain.      The patient does  require positioning of the body in ways not feasible with an ordinary bed due to a medical condition that is expected to last at least 1 month due to right leg hematoma and acute difficult to control pain.  The patient does require, for the alleviation of pain, postioning of the body in ways not feasible with an ordinary bed.   The patient does  require the head of bed elevated more than 30* most of the time due to CHF, chronic pulmonary disease or  "aspiration.  The patient does not require traction that can only be attached to a hospital bed.  The patient does require a bed height different than a fixed height hospital bed to permit tranfers to wheelchair or standing position.   The patient does require frequent or immediate changes in body position due to CHF, edema and pain.       Pt needing above DME with expected length of need of 3  months  due to medical necessity associated with following diagnosis:     Wound cellulitis  Hematoma of right lower extremity, subsequent encounter  Fall, subsequent encounter  Acute pain of lower extremity, right  Physical deconditioning  Anemia due to blood loss, acute  Coronary artery disease involving native coronary artery of native heart with angina pectoris (H)  Chronic systolic congestive heart failure (H)  PAF (paroxysmal atrial fibrillation) (H)  Essential hypertension  Hypokalemia  Stage 3 chronic kidney disease, unspecified whether stage 3a or 3b CKD      PMH   has no past medical history on file.    ROS:4 point ROS including Respiratory, CV, GI and , other than that noted in the HPI,  is negative    EXAM  Vitals: /84   Pulse 78   Temp 97.5  F (36.4  C)   Resp 18   Ht 1.803 m (5' 11\")   Wt 82.6 kg (182 lb)   SpO2 98%   BMI 25.38 kg/m  ;BMI= Body mass index is 25.38 kg/m .   Please see progress note above for exam    ASSESSMENT/PLAN:  1. Wound cellulitis    2. Hematoma of right lower extremity, subsequent encounter    3. Fall, subsequent encounter    4. Acute pain of lower extremity, right    5. Physical deconditioning    6. Anemia due to blood loss, acute    7. Coronary artery disease involving native coronary artery of native heart with angina pectoris (H)    8. Chronic systolic congestive heart failure (H)    9. PAF (paroxysmal atrial fibrillation) (H)    10. Essential hypertension    11. Hypokalemia    12. Stage 3 chronic kidney disease, unspecified whether stage 3a or 3b CKD        Orders:  1. " Facility staff/TC to contact DME company to get their order form for provider to fill out    ELECTRONICALLY SIGNED BY PECOS CERTIFIED PROVIDER:  JIMMIE Johnson CNP   NPI [id number]   [facility]  [address] [place] [address], [address]  [place] [address]

## 2020-11-14 NOTE — TELEPHONE ENCOUNTER
Called re: labs. K 2.9.     Plan:  -- KCl 40 mEq tomorrow 11/14 and Nehemiah 11/15  -- recheck K 11/16    Oly Portillo MD

## 2024-11-29 ENCOUNTER — LAB REQUISITION (OUTPATIENT)
Dept: LAB | Facility: CLINIC | Age: 79
End: 2024-11-29
Payer: COMMERCIAL

## 2024-11-29 DIAGNOSIS — I50.22 CHRONIC SYSTOLIC (CONGESTIVE) HEART FAILURE (H): ICD-10-CM
